# Patient Record
Sex: MALE | Race: WHITE | NOT HISPANIC OR LATINO | Employment: PART TIME | URBAN - NONMETROPOLITAN AREA
[De-identification: names, ages, dates, MRNs, and addresses within clinical notes are randomized per-mention and may not be internally consistent; named-entity substitution may affect disease eponyms.]

---

## 2017-03-29 ENCOUNTER — APPOINTMENT (OUTPATIENT)
Dept: LAB | Facility: HOSPITAL | Age: 27
End: 2017-03-29

## 2017-03-29 ENCOUNTER — OFFICE VISIT (OUTPATIENT)
Dept: BARIATRICS/WEIGHT MGMT | Facility: CLINIC | Age: 27
End: 2017-03-29

## 2017-03-29 VITALS
SYSTOLIC BLOOD PRESSURE: 130 MMHG | HEIGHT: 73 IN | DIASTOLIC BLOOD PRESSURE: 75 MMHG | RESPIRATION RATE: 20 BRPM | TEMPERATURE: 98 F | WEIGHT: 181 LBS | BODY MASS INDEX: 23.99 KG/M2 | OXYGEN SATURATION: 100 % | HEART RATE: 80 BPM

## 2017-03-29 DIAGNOSIS — Z86.39 HISTORY OF MORBID OBESITY: ICD-10-CM

## 2017-03-29 DIAGNOSIS — R53.83 FATIGUE, UNSPECIFIED TYPE: ICD-10-CM

## 2017-03-29 DIAGNOSIS — E66.9 DIABETES MELLITUS TYPE 2 IN OBESE (HCC): ICD-10-CM

## 2017-03-29 DIAGNOSIS — Z98.84 S/P GASTRIC BYPASS: Primary | ICD-10-CM

## 2017-03-29 DIAGNOSIS — I10 ESSENTIAL HYPERTENSION: ICD-10-CM

## 2017-03-29 DIAGNOSIS — Z13.21 SCREENING FOR MALNUTRITION: ICD-10-CM

## 2017-03-29 DIAGNOSIS — E11.69 DIABETES MELLITUS TYPE 2 IN OBESE (HCC): ICD-10-CM

## 2017-03-29 LAB
25(OH)D3 SERPL-MCNC: 38.1 NG/ML (ref 30–100)
ALBUMIN SERPL-MCNC: 4.2 G/DL (ref 3.5–5)
ALBUMIN/GLOB SERPL: 1.4 G/DL (ref 1.1–2.5)
ALP SERPL-CCNC: 63 U/L (ref 24–120)
ALT SERPL W P-5'-P-CCNC: 22 U/L (ref 0–54)
ANION GAP SERPL CALCULATED.3IONS-SCNC: 12 MMOL/L (ref 4–13)
ARTICHOKE IGE QN: 58 MG/DL (ref 0–99)
AST SERPL-CCNC: 37 U/L (ref 7–45)
BILIRUB SERPL-MCNC: 0.7 MG/DL (ref 0.1–1)
BUN BLD-MCNC: 11 MG/DL (ref 5–21)
BUN/CREAT SERPL: 18.3 (ref 7–25)
CALCIUM SPEC-SCNC: 9.8 MG/DL (ref 8.4–10.4)
CHLORIDE SERPL-SCNC: 101 MMOL/L (ref 98–110)
CHOLEST SERPL-MCNC: 108 MG/DL (ref 130–200)
CO2 SERPL-SCNC: 32 MMOL/L (ref 24–31)
CREAT BLD-MCNC: 0.6 MG/DL (ref 0.5–1.4)
DEPRECATED RDW RBC AUTO: 39.8 FL (ref 40–54)
ERYTHROCYTE [DISTWIDTH] IN BLOOD BY AUTOMATED COUNT: 13.3 % (ref 12–15)
FOLATE SERPL-MCNC: 12.6 NG/ML
GFR SERPL CREATININE-BSD FRML MDRD: >150 ML/MIN/1.73
GLOBULIN UR ELPH-MCNC: 2.9 GM/DL
GLUCOSE BLD-MCNC: 82 MG/DL (ref 70–100)
HBA1C MFR BLD: 4.5 %
HCT VFR BLD AUTO: 41.8 % (ref 40–52)
HDLC SERPL-MCNC: 37 MG/DL
HGB BLD-MCNC: 14.2 G/DL (ref 14–18)
IRON 24H UR-MRATE: 170 MCG/DL (ref 42–180)
LDLC/HDLC SERPL: 1.66 {RATIO}
MAGNESIUM SERPL-MCNC: 1.7 MG/DL (ref 1.4–2.2)
MCH RBC QN AUTO: 28.2 PG (ref 28–32)
MCHC RBC AUTO-ENTMCNC: 34 G/DL (ref 33–36)
MCV RBC AUTO: 83.1 FL (ref 82–95)
PHOSPHATE SERPL-MCNC: 3.5 MG/DL (ref 2.5–4.5)
PLATELET # BLD AUTO: 255 10*3/MM3 (ref 130–400)
PMV BLD AUTO: 9.6 FL (ref 6–12)
POTASSIUM BLD-SCNC: 3.9 MMOL/L (ref 3.5–5.3)
PROT SERPL-MCNC: 7.1 G/DL (ref 6.3–8.7)
RBC # BLD AUTO: 5.03 10*6/MM3 (ref 4.8–5.9)
SODIUM BLD-SCNC: 145 MMOL/L (ref 135–145)
TRIGL SERPL-MCNC: 48 MG/DL (ref 0–149)
TSH SERPL DL<=0.05 MIU/L-ACNC: 1.64 MIU/ML (ref 0.47–4.68)
VIT B12 BLD-MCNC: 685 PG/ML (ref 239–931)
WBC NRBC COR # BLD: 6.49 10*3/MM3 (ref 4.8–10.8)

## 2017-03-29 PROCEDURE — 36415 COLL VENOUS BLD VENIPUNCTURE: CPT | Performed by: NURSE PRACTITIONER

## 2017-03-29 PROCEDURE — 99214 OFFICE O/P EST MOD 30 MIN: CPT | Performed by: NURSE PRACTITIONER

## 2017-03-29 PROCEDURE — 83735 ASSAY OF MAGNESIUM: CPT | Performed by: NURSE PRACTITIONER

## 2017-03-29 PROCEDURE — 82306 VITAMIN D 25 HYDROXY: CPT | Performed by: NURSE PRACTITIONER

## 2017-03-29 PROCEDURE — 84590 ASSAY OF VITAMIN A: CPT | Performed by: NURSE PRACTITIONER

## 2017-03-29 PROCEDURE — 84443 ASSAY THYROID STIM HORMONE: CPT | Performed by: NURSE PRACTITIONER

## 2017-03-29 PROCEDURE — 84425 ASSAY OF VITAMIN B-1: CPT | Performed by: NURSE PRACTITIONER

## 2017-03-29 PROCEDURE — 80061 LIPID PANEL: CPT | Performed by: NURSE PRACTITIONER

## 2017-03-29 PROCEDURE — 82607 VITAMIN B-12: CPT | Performed by: NURSE PRACTITIONER

## 2017-03-29 PROCEDURE — 84100 ASSAY OF PHOSPHORUS: CPT | Performed by: NURSE PRACTITIONER

## 2017-03-29 PROCEDURE — 80053 COMPREHEN METABOLIC PANEL: CPT | Performed by: NURSE PRACTITIONER

## 2017-03-29 PROCEDURE — 84446 ASSAY OF VITAMIN E: CPT | Performed by: NURSE PRACTITIONER

## 2017-03-29 PROCEDURE — 83540 ASSAY OF IRON: CPT | Performed by: NURSE PRACTITIONER

## 2017-03-29 PROCEDURE — 84630 ASSAY OF ZINC: CPT | Performed by: NURSE PRACTITIONER

## 2017-03-29 PROCEDURE — 85027 COMPLETE CBC AUTOMATED: CPT | Performed by: NURSE PRACTITIONER

## 2017-03-29 PROCEDURE — 84597 ASSAY OF VITAMIN K: CPT | Performed by: NURSE PRACTITIONER

## 2017-03-29 PROCEDURE — 82746 ASSAY OF FOLIC ACID SERUM: CPT | Performed by: NURSE PRACTITIONER

## 2017-03-29 PROCEDURE — 83036 HEMOGLOBIN GLYCOSYLATED A1C: CPT | Performed by: NURSE PRACTITIONER

## 2017-03-29 NOTE — PROGRESS NOTES
"Subjective   Kevin Marr is a 26 y.o. male.     History of Present Illness Kevin Marr is post RYGB bariatric surgery with Dr. Abreu. He is actually 20 months post. Patient is able to get 80 ounces of water in per day. Patient is able to get 60-80  grams of protein in per day. Patient is taking a multiple vitamin per day. Patient is taking Calcium with vitamin D supplement 2-3 times daily. He gets 4 meals per day, 2 of which are protein shakes as meal replacements. Patient is exercising by going to gym and park. Patient does not have any complaints of reflux, dysphagia, N/V, or abdominal pain. He is having some tightness in his left lower abdomen toward his back and has a lump there. He is tired a lot.       The following portions of the patient's history were reviewed and updated as appropriate: allergies, current medications, past family history, past medical history, past social history, past surgical history and problem list.    Review of Systems   Constitutional: Positive for fatigue. Negative for activity change and appetite change.   HENT: Negative.    Eyes: Negative.    Respiratory: Negative.  Negative for apnea, cough, chest tightness and shortness of breath.    Cardiovascular: Negative.  Negative for chest pain, palpitations and leg swelling.   Gastrointestinal: Negative.  Negative for abdominal pain, anal bleeding, constipation, nausea and vomiting.        Intermittent \"tightness\" in left lower quadrant and feels like a \"lump\" may be there   Endocrine: Negative.    Genitourinary: Negative.    Musculoskeletal: Negative.  Negative for arthralgias and back pain.   Skin: Negative.    Allergic/Immunologic: Negative.    Neurological: Negative.  Negative for seizures and syncope.   Hematological: Negative.  Does not bruise/bleed easily.   Psychiatric/Behavioral: Negative.  Negative for dysphoric mood, self-injury and sleep disturbance.       Objective   Physical Exam   Constitutional: He is oriented " to person, place, and time. Vital signs are normal. He appears well-developed and well-nourished. He is cooperative. No distress.   HENT:   Head: Normocephalic and atraumatic.   Nose: Nose normal.   Mouth/Throat: Oropharynx is clear and moist. No oropharyngeal exudate or tonsillar abscesses.   Gauges noted to ear lobes   Eyes: Conjunctivae, EOM and lids are normal. Pupils are equal, round, and reactive to light. Right eye exhibits no discharge. Left eye exhibits no discharge.   Glasses noted   Neck: Trachea normal. Neck supple. No JVD present. Carotid bruit is not present. No rigidity. No tracheal deviation present. No thyromegaly present.   Cardiovascular: Normal rate, regular rhythm, S1 normal, S2 normal and normal heart sounds.    Pulmonary/Chest: Effort normal and breath sounds normal. No stridor. No respiratory distress. He has no wheezes. He has no rales.   Abdominal: Soft. Bowel sounds are normal. He exhibits no distension. There is no tenderness.   Scattered lap scars noted; no hernias noted; small cyst or lipoma palpated over to left side of abdomen near waist line   Musculoskeletal: He exhibits no edema.        Right shoulder: He exhibits normal strength.   Lymphadenopathy:     He has no cervical adenopathy.   Neurological: He is alert and oriented to person, place, and time. He has normal strength. No cranial nerve deficit.   Skin: Skin is warm, dry and intact. No rash noted.   Psychiatric: He has a normal mood and affect. His speech is normal and behavior is normal.   Alert and oriented x 3   Vitals reviewed.      Assessment/Plan   Kevin was seen today for weight loss and obesity.    Diagnoses and all orders for this visit:    S/P gastric bypass  -     CBC (No Diff)  -     Comprehensive Metabolic Panel  -     Folate  -     Iron  -     Magnesium  -     Phosphorus  -     TSH  -     Vitamin A  -     Vitamin B1, Whole Blood  -     Vitamin B12  -     Vitamin D 25 Hydroxy  -     Vitamin E  -     Vitamin K1  -      Zinc  -     Lipid Panel  -     Hemoglobin A1c    Body mass index (BMI) of 23.0-23.9 in adult  -     CBC (No Diff)  -     Comprehensive Metabolic Panel  -     Folate  -     Iron  -     Magnesium  -     Phosphorus  -     TSH  -     Vitamin A  -     Vitamin B1, Whole Blood  -     Vitamin B12  -     Vitamin D 25 Hydroxy  -     Vitamin E  -     Vitamin K1  -     Zinc  -     Lipid Panel  -     Hemoglobin A1c    Screening for malnutrition  -     CBC (No Diff)  -     Comprehensive Metabolic Panel  -     Folate  -     Iron  -     Magnesium  -     Phosphorus  -     TSH  -     Vitamin A  -     Vitamin B1, Whole Blood  -     Vitamin B12  -     Vitamin D 25 Hydroxy  -     Vitamin E  -     Vitamin K1  -     Zinc  -     Lipid Panel  -     Hemoglobin A1c    Diabetes mellitus type 2 in obese  -     CBC (No Diff)  -     Comprehensive Metabolic Panel  -     Folate  -     Iron  -     Magnesium  -     Phosphorus  -     TSH  -     Vitamin A  -     Vitamin B1, Whole Blood  -     Vitamin B12  -     Vitamin D 25 Hydroxy  -     Vitamin E  -     Vitamin K1  -     Zinc  -     Lipid Panel  -     Hemoglobin A1c    Essential hypertension  -     CBC (No Diff)  -     Comprehensive Metabolic Panel  -     Folate  -     Iron  -     Magnesium  -     Phosphorus  -     TSH  -     Vitamin A  -     Vitamin B1, Whole Blood  -     Vitamin B12  -     Vitamin D 25 Hydroxy  -     Vitamin E  -     Vitamin K1  -     Zinc  -     Lipid Panel  -     Hemoglobin A1c    History of morbid obesity  -     CBC (No Diff)  -     Comprehensive Metabolic Panel  -     Folate  -     Iron  -     Magnesium  -     Phosphorus  -     TSH  -     Vitamin A  -     Vitamin B1, Whole Blood  -     Vitamin B12  -     Vitamin D 25 Hydroxy  -     Vitamin E  -     Vitamin K1  -     Zinc  -     Lipid Panel  -     Hemoglobin A1c    Fatigue, unspecified type  -     CBC (No Diff)  -     Comprehensive Metabolic Panel  -     Folate  -     Iron  -     Magnesium  -     Phosphorus  -     TSH  -      Vitamin A  -     Vitamin B1, Whole Blood  -     Vitamin B12  -     Vitamin D 25 Hydroxy  -     Vitamin E  -     Vitamin K1  -     Zinc  -     Lipid Panel  -     Hemoglobin A1c          Patient is doing well overall. Having some fatigue.   Lab work will be drawn today. Results will be called to you once everything is completed.    Goals: continue positive lifestyle changes with diet and exercise.   Patient subscribes to Support group emails.  Patient will follow up in 12 months and will call the office if needs anything prior to that appointment.

## 2017-03-29 NOTE — PATIENT INSTRUCTIONS
Patient is doing well overall. Having some fatigue.   Lab work will be drawn today. Results will be called to you once everything is completed.    Goals: continue positive lifestyle changes with diet and exercise.   Patient subscribes to Support group emails.  Patient will follow up in 12 months and will call the office if needs anything prior to that appointment.

## 2017-03-31 LAB
VIT B1 BLD-SCNC: 138.8 NMOL/L (ref 66.5–200)
ZINC SERPL-MCNC: 75 UG/DL (ref 56–134)

## 2017-04-01 LAB
A-TOCOPHEROL VIT E SERPL-MCNC: 6.6 MG/L (ref 5.3–17.5)
PHYTONADIONE SERPL-MCNC: 0.24 NG/ML (ref 0.13–1.88)
VIT A SERPL-MCNC: 41 UG/DL (ref 24–85)

## 2018-05-17 ENCOUNTER — OFFICE VISIT (OUTPATIENT)
Dept: VASCULAR SURGERY | Facility: CLINIC | Age: 28
End: 2018-05-17

## 2018-05-17 VITALS
DIASTOLIC BLOOD PRESSURE: 80 MMHG | SYSTOLIC BLOOD PRESSURE: 124 MMHG | BODY MASS INDEX: 23.86 KG/M2 | HEIGHT: 73 IN | HEART RATE: 80 BPM | WEIGHT: 180 LBS | OXYGEN SATURATION: 98 %

## 2018-05-17 DIAGNOSIS — I87.2 VENOUS (PERIPHERAL) INSUFFICIENCY: Primary | ICD-10-CM

## 2018-05-17 DIAGNOSIS — M79.89 LEG SWELLING: ICD-10-CM

## 2018-05-17 PROCEDURE — 99204 OFFICE O/P NEW MOD 45 MIN: CPT | Performed by: SURGERY

## 2018-05-17 NOTE — PROGRESS NOTES
05/17/2018      Kevin Bellamy MD  [address] [address]    Kevin Marr  1990    Chief Complaint   Patient presents with   • Follow-up     Dr Veras referred over for painful varicosities. Patient states no stroke like symptoms. Patient states has knots up an down his legs. He states the other night was playing a game felt like a bug was crawling up his leg looked down vein was blue and popped out and instantly turned purple.        Dear Kevin Bellamy MD:      HPI  I had the pleasure of seeing your patient Kevin Marr in the office today.  Thank you kindly for this consultation.  As you recall, Kevin Marr is a 27 y.o.  male who you are currently following for Routine health maintenance.  Patient has previously had gastric bypass surgery and has lost a significant amount of weight.  He has heaviness and tiredness of both his legs over the last 1-2 months that is progressively getting worse.  He also occasionally has cramps at night when he sleeps.  He has no history of DVT or phlebitis.  He also denies using compression stockings.    Past Medical History:   Diagnosis Date   • Anxiety    • Attention deficit hyperactivity disorder    • Depression    • Diabetes mellitus    • Hypertension     no current problems    • Varicose veins of leg with pain        Past Surgical History:   Procedure Laterality Date   • INGUINAL HERNIA REPAIR     • LYMPH NODE BIOPSY     • SAMIR-EN-Y PROCEDURE  07/24/2015    Dr Abreu [facility]    • THYROGLOSSAL CYSTECTOMY      removal of thryoglossal duct cyst 4 surgeries       Family History   Problem Relation Age of Onset   • Adopted: Yes   • Family history unknown: Yes       Social History     Social History   • Marital status: Single     Spouse name: N/A   • Number of children: N/A   • Years of education: N/A     Occupational History   • Not on file.     Social History Main Topics   • Smoking status: Former Smoker      "Types: Cigarettes   • Smokeless tobacco: Not on file      Comment: Quit 2015   • Alcohol use Yes      Comment: Ocassional   • Drug use: No   • Sexual activity: Not on file     Other Topics Concern   • Not on file     Social History Narrative   • No narrative on file       Allergies   Allergen Reactions   • Zithromax [Azithromycin] Swelling       Prior to Admission medications    Medication Sig Start Date End Date Taking? Authorizing Provider   Ascorbic Acid (VITAMIN C PO) Take  by mouth Daily.   Yes Historical Provider, MD   calcium citrate-vitamin d (CITRACAL) 200-250 MG-UNIT tablet tablet Take 1 tablet by mouth Daily.   Yes Historical Provider, MD   Cyanocobalamin (VITAMIN B 12 PO) Take  by mouth Daily.   Yes Historical Provider, MD   Multiple Vitamin (MULTI VITAMIN PO) Take  by mouth Daily.   Yes Historical Provider, MD   sertraline (ZOLOFT) 50 MG tablet Take 50 mg by mouth Daily.   Yes Historical Provider, MD       Review of Systems   Constitutional: Negative.    HENT: Negative.    Eyes: Negative.    Respiratory: Negative.    Cardiovascular: Positive for leg swelling.        Leg cramping   Gastrointestinal: Negative.    Endocrine: Negative.    Genitourinary: Negative.    Musculoskeletal: Negative.    Skin: Negative.    Allergic/Immunologic: Negative.    Neurological: Negative.    Hematological: Negative.    Psychiatric/Behavioral: Negative.        /80 (BP Location: Right arm, Patient Position: Sitting)   Pulse 80   Ht 184.2 cm (72.5\")   Wt 81.6 kg (180 lb)   SpO2 98%   BMI 24.08 kg/m²   Physical Exam   Constitutional: He is oriented to person, place, and time. He appears well-developed and well-nourished.   HENT:   Head: Normocephalic and atraumatic.   Eyes: Pupils are equal, round, and reactive to light. No scleral icterus.   Neck: Neck supple. No JVD present. Carotid bruit is not present. No thyromegaly present.   Cardiovascular: Normal rate, regular rhythm and normal heart sounds.    Pulses:       " Carotid pulses are 2+ on the right side, and 2+ on the left side.       Femoral pulses are 2+ on the right side, and 2+ on the left side.       Popliteal pulses are 2+ on the right side, and 2+ on the left side.        Dorsalis pedis pulses are 2+ on the right side, and 2+ on the left side.        Posterior tibial pulses are 2+ on the right side, and 2+ on the left side.   Varicose veins noted in both lower extremities   Pulmonary/Chest: Effort normal and breath sounds normal.   Abdominal: Soft. Bowel sounds are normal. He exhibits no distension, no abdominal bruit and no mass. There is no hepatosplenomegaly. There is no tenderness.   Musculoskeletal: Normal range of motion. He exhibits no edema.   Lymphadenopathy:     He has no cervical adenopathy.   Neurological: He is alert and oriented to person, place, and time. He has normal strength. No cranial nerve deficit or sensory deficit.   Skin: Skin is warm, dry and intact.   Psychiatric: He has a normal mood and affect.   Nursing note and vitals reviewed.      No results found.    Patient Active Problem List   Diagnosis   • Leg swelling   • Venous (peripheral) insufficiency         ICD-10-CM ICD-9-CM   1. Venous (peripheral) insufficiency I87.2 459.81   2. Leg swelling M79.89 729.81           Plan: After thoroughly evaluating Kevin Marr, I believe the best course of action is to Initially remain conservative from a vascular surgery standpoint.  He does have evidence of venous insufficiency with varicose veins.  All of his symptoms are related to this.  I did give him compression stockings in the 20-30 mmHg range.  I instructed him on how to use them and to keep his legs elevated when he is not on them.  I will see him back in 3 months time with a 2 leg VVI to see if he is a candidate for endovenous closure.  The patient can continue taking her current medication regimen as previously planned.  This was all discussed in full with complete understanding.    Thank  you for allowing me to participate in the care of your patient.  Please do not hesitate with any questions or concerns.  I will keep you aware of any further encounters with Kevin Marr.        Sincerely yours,         Rudy Silva, DO    No Known Provider

## 2018-08-15 ENCOUNTER — TELEPHONE (OUTPATIENT)
Dept: VASCULAR SURGERY | Facility: CLINIC | Age: 28
End: 2018-08-15

## 2018-08-16 ENCOUNTER — APPOINTMENT (OUTPATIENT)
Dept: ULTRASOUND IMAGING | Facility: HOSPITAL | Age: 28
End: 2018-08-16
Attending: SURGERY

## 2020-03-03 ENCOUNTER — APPOINTMENT (OUTPATIENT)
Dept: CT IMAGING | Facility: HOSPITAL | Age: 30
End: 2020-03-03

## 2020-03-03 ENCOUNTER — HOSPITAL ENCOUNTER (EMERGENCY)
Facility: HOSPITAL | Age: 30
Discharge: HOME | End: 2020-03-03
Admitting: EMERGENCY MEDICINE

## 2020-03-03 VITALS
RESPIRATION RATE: 15 BRPM | WEIGHT: 206 LBS | HEART RATE: 87 BPM | HEIGHT: 72 IN | SYSTOLIC BLOOD PRESSURE: 151 MMHG | TEMPERATURE: 98.3 F | DIASTOLIC BLOOD PRESSURE: 72 MMHG | BODY MASS INDEX: 27.9 KG/M2 | OXYGEN SATURATION: 100 %

## 2020-03-03 DIAGNOSIS — E04.1 THYROID NODULE: Primary | ICD-10-CM

## 2020-03-03 LAB
ALBUMIN SERPL-MCNC: 4.7 G/DL (ref 3.5–5.2)
ALBUMIN/GLOB SERPL: 1.7 G/DL
ALP SERPL-CCNC: 65 U/L (ref 39–117)
ALT SERPL W P-5'-P-CCNC: 13 U/L (ref 1–41)
ANION GAP SERPL CALCULATED.3IONS-SCNC: 13 MMOL/L (ref 5–15)
AST SERPL-CCNC: 20 U/L (ref 1–40)
BASOPHILS # BLD AUTO: 0.03 10*3/MM3 (ref 0–0.2)
BASOPHILS NFR BLD AUTO: 0.4 % (ref 0–1.5)
BILIRUB SERPL-MCNC: 0.7 MG/DL (ref 0.2–1.2)
BUN BLD-MCNC: 11 MG/DL (ref 6–20)
BUN/CREAT SERPL: 15.7 (ref 7–25)
CALCIUM SPEC-SCNC: 9.9 MG/DL (ref 8.6–10.5)
CHLORIDE SERPL-SCNC: 102 MMOL/L (ref 98–107)
CO2 SERPL-SCNC: 27 MMOL/L (ref 22–29)
CREAT BLD-MCNC: 0.7 MG/DL (ref 0.76–1.27)
DEPRECATED RDW RBC AUTO: 37.1 FL (ref 37–54)
EOSINOPHIL # BLD AUTO: 0.06 10*3/MM3 (ref 0–0.4)
EOSINOPHIL NFR BLD AUTO: 0.7 % (ref 0.3–6.2)
ERYTHROCYTE [DISTWIDTH] IN BLOOD BY AUTOMATED COUNT: 12.3 % (ref 12.3–15.4)
FLUAV AG NPH QL: NEGATIVE
FLUBV AG NPH QL IA: NEGATIVE
GFR SERPL CREATININE-BSD FRML MDRD: 135 ML/MIN/1.73
GLOBULIN UR ELPH-MCNC: 2.8 GM/DL
GLUCOSE BLD-MCNC: 91 MG/DL (ref 65–99)
HCT VFR BLD AUTO: 44.6 % (ref 37.5–51)
HGB BLD-MCNC: 15.8 G/DL (ref 13–17.7)
IMM GRANULOCYTES # BLD AUTO: 0.03 10*3/MM3 (ref 0–0.05)
IMM GRANULOCYTES NFR BLD AUTO: 0.4 % (ref 0–0.5)
LYMPHOCYTES # BLD AUTO: 1.82 10*3/MM3 (ref 0.7–3.1)
LYMPHOCYTES NFR BLD AUTO: 21.7 % (ref 19.6–45.3)
MCH RBC QN AUTO: 29.4 PG (ref 26.6–33)
MCHC RBC AUTO-ENTMCNC: 35.4 G/DL (ref 31.5–35.7)
MCV RBC AUTO: 82.9 FL (ref 79–97)
MONOCYTES # BLD AUTO: 0.41 10*3/MM3 (ref 0.1–0.9)
MONOCYTES NFR BLD AUTO: 4.9 % (ref 5–12)
NEUTROPHILS # BLD AUTO: 6.04 10*3/MM3 (ref 1.7–7)
NEUTROPHILS NFR BLD AUTO: 71.9 % (ref 42.7–76)
NRBC BLD AUTO-RTO: 0 /100 WBC (ref 0–0.2)
PLATELET # BLD AUTO: 225 10*3/MM3 (ref 140–450)
PMV BLD AUTO: 9 FL (ref 6–12)
POTASSIUM BLD-SCNC: 3.7 MMOL/L (ref 3.5–5.2)
PROT SERPL-MCNC: 7.5 G/DL (ref 6–8.5)
RBC # BLD AUTO: 5.38 10*6/MM3 (ref 4.14–5.8)
S PYO AG THROAT QL: NEGATIVE
SODIUM BLD-SCNC: 142 MMOL/L (ref 136–145)
T4 FREE SERPL-MCNC: 1.34 NG/DL (ref 0.93–1.7)
TSH SERPL DL<=0.05 MIU/L-ACNC: 1.01 UIU/ML (ref 0.27–4.2)
WBC NRBC COR # BLD: 8.39 10*3/MM3 (ref 3.4–10.8)

## 2020-03-03 PROCEDURE — 84439 ASSAY OF FREE THYROXINE: CPT | Performed by: PHYSICIAN ASSISTANT

## 2020-03-03 PROCEDURE — 87081 CULTURE SCREEN ONLY: CPT | Performed by: PHYSICIAN ASSISTANT

## 2020-03-03 PROCEDURE — 87804 INFLUENZA ASSAY W/OPTIC: CPT | Performed by: PHYSICIAN ASSISTANT

## 2020-03-03 PROCEDURE — 85025 COMPLETE CBC W/AUTO DIFF WBC: CPT | Performed by: PHYSICIAN ASSISTANT

## 2020-03-03 PROCEDURE — 25010000002 IOPAMIDOL 61 % SOLUTION: Performed by: PHYSICIAN ASSISTANT

## 2020-03-03 PROCEDURE — 87880 STREP A ASSAY W/OPTIC: CPT | Performed by: PHYSICIAN ASSISTANT

## 2020-03-03 PROCEDURE — 96360 HYDRATION IV INFUSION INIT: CPT

## 2020-03-03 PROCEDURE — 99283 EMERGENCY DEPT VISIT LOW MDM: CPT

## 2020-03-03 PROCEDURE — 96361 HYDRATE IV INFUSION ADD-ON: CPT

## 2020-03-03 PROCEDURE — 70491 CT SOFT TISSUE NECK W/DYE: CPT

## 2020-03-03 PROCEDURE — 80053 COMPREHEN METABOLIC PANEL: CPT | Performed by: PHYSICIAN ASSISTANT

## 2020-03-03 PROCEDURE — 84443 ASSAY THYROID STIM HORMONE: CPT | Performed by: PHYSICIAN ASSISTANT

## 2020-03-03 PROCEDURE — 84481 FREE ASSAY (FT-3): CPT | Performed by: PHYSICIAN ASSISTANT

## 2020-03-03 RX ORDER — SODIUM CHLORIDE 9 MG/ML
125 INJECTION, SOLUTION INTRAVENOUS CONTINUOUS
Status: DISCONTINUED | OUTPATIENT
Start: 2020-03-03 | End: 2020-03-03 | Stop reason: HOSPADM

## 2020-03-03 RX ADMIN — SODIUM CHLORIDE 125 ML/HR: 9 INJECTION, SOLUTION INTRAVENOUS at 17:05

## 2020-03-03 RX ADMIN — IOPAMIDOL 100 ML: 612 INJECTION, SOLUTION INTRAVENOUS at 18:12

## 2020-03-03 NOTE — ED PROVIDER NOTES
Subjective   History of Present Illness    Patient is a pleasant 29-year-old male chief complaint of throat mass.  The patient describes for the past 3 days, he noted some irritation, difficulty swallowing pills, and a mass develop around his thyroid.  This mass is getting larger in size.  He denies any difficulty breathing but is concerned.  He has had cough and congestion for the past several days as well.  He reports green productive phlegm.  His roommate has similar symptoms as well in regards to the respiratory symptoms.  He denies any hemoptysis.  He denies any unexpected weight loss.  He does have tender lymph nodes in front of his right ear.  He denies any abnormal rash.  He is concerned because at the age of [age], he did have a thyroglossal cystectomy.  He denies any the tests revealing cancer.  He reports it was benign.  He denies any associated vomiting or diarrhea.  He reports normal urinary output.    Review of Systems   Constitutional: Positive for activity change. Negative for fever.   HENT: Positive for sore throat and trouble swallowing.    Eyes: Negative.    Respiratory: Positive for cough.    Cardiovascular: Negative.    Gastrointestinal: Negative.    Genitourinary: Negative.    Musculoskeletal: Negative.    Skin: Negative.    Neurological: Negative.    Psychiatric/Behavioral: Negative.        Past Medical History:   Diagnosis Date   • Anxiety    • Attention deficit hyperactivity disorder    • Depression    • Diabetes mellitus (CMS/HCC)     patient reports that he is no longer diabetic    • Disease of thyroid gland     thyroid cyst    • Hypertension     no current problems    • Varicose veins of leg with pain        Allergies   Allergen Reactions   • Zithromax [Azithromycin] Swelling       Past Surgical History:   Procedure Laterality Date   • CIRCUMCISION     • INGUINAL HERNIA REPAIR     • LYMPH NODE BIOPSY     • SAMIR-EN-Y PROCEDURE  07/24/2015    Dr [facility]breu [facility]    •  "THYROGLOSSAL CYSTECTOMY      removal of thryoglossal duct cyst 4 surgeries       Family History   Adopted: Yes   Family history unknown: Yes       Social History     Socioeconomic History   • Marital status: Single     Spouse name: Not on file   • Number of children: Not on file   • Years of education: Not on file   • Highest education level: Not on file   Tobacco Use   • Smoking status: Former Smoker     Types: Cigarettes   • Tobacco comment: Quit 2015   Substance and Sexual Activity   • Alcohol use: Yes     Comment: Ocassional   • Drug use: No       Prior to Admission medications    Medication Sig Start Date End Date Taking? Authorizing Provider   Ascorbic Acid (VITAMIN C PO) Take  by mouth Daily.    Jeannie Valle MD   calcium citrate-vitamin d (CITRACAL) 200-250 MG-UNIT tablet tablet Take 1 tablet by mouth Daily.    Jeannie Valle MD   Cyanocobalamin (VITAMIN B 12 PO) Take  by mouth Daily.    Jeannie Valle MD   Multiple Vitamin (MULTI VITAMIN PO) Take  by mouth Daily.    Jeannie Valle MD   sertraline (ZOLOFT) 50 MG tablet Take 50 mg by mouth Daily.    Jeannie Valle MD       Medications   sodium chloride 0.9 % infusion (125 mL/hr Intravenous New Bag 3/3/20 1705)   iopamidol (ISOVUE-300) 61 % injection 100 mL (100 mL Intravenous Given 3/3/20 1812)       /87 (BP Location: Right arm, Patient Position: Sitting)   Pulse 98   Temp 98.3 °F (36.8 °C) (Oral)   Resp 14   Ht 182.9 cm (72\")   Wt 93.4 kg (206 lb)   SpO2 100%   BMI 27.94 kg/m²       Objective   Physical Exam   Constitutional: He is oriented to person, place, and time. He appears well-developed and well-nourished.   HENT:   Head: Normocephalic and atraumatic.   Right Ear: External ear normal.   Left Ear: External ear normal.   Nose: Nose normal.   Mouth/Throat: Oropharynx is clear and moist.   Eyes: Pupils are equal, round, and reactive to light. Conjunctivae and EOM are normal.   Neck: Normal range of motion. " Neck supple. No tracheal deviation present. Thyromegaly present.   Cardiovascular: Normal rate, regular rhythm, normal heart sounds and intact distal pulses. Exam reveals no gallop and no friction rub.   No murmur heard.  Pulmonary/Chest: Effort normal and breath sounds normal. No respiratory distress. He has no wheezes. He has no rales. He exhibits no tenderness.   Abdominal: Soft. Bowel sounds are normal. He exhibits no distension and no mass. There is no tenderness. There is no rebound and no guarding.   Musculoskeletal: Normal range of motion. He exhibits no edema, tenderness or deformity.   Lymphadenopathy:        Head (right side): No submental, no submandibular, no tonsillar, no preauricular, no posterior auricular and no occipital adenopathy present.        Head (left side): No submental, no submandibular, no tonsillar, no preauricular, no posterior auricular and no occipital adenopathy present.     He has no cervical adenopathy.        Right cervical: No superficial cervical, no deep cervical and no posterior cervical adenopathy present.       Left cervical: No superficial cervical, no deep cervical and no posterior cervical adenopathy present.     He has no axillary adenopathy.        Right: No inguinal, no supraclavicular and no epitrochlear adenopathy present.        Left: No inguinal, no supraclavicular and no epitrochlear adenopathy present.   Neurological: He is alert and oriented to person, place, and time. He has normal reflexes. He exhibits normal muscle tone. Coordination normal.   Skin: Skin is warm and dry. Capillary refill takes less than 2 seconds. No rash noted. No erythema. No pallor.   Psychiatric: He has a normal mood and affect. His behavior is normal. Judgment and thought content normal.   Vitals reviewed.      Procedures         Lab Results (last 24 hours)     Procedure Component Value Units Date/Time    CBC & Differential [[id number]] Collected:  03/03/20 1705    Specimen:  Blood  Updated:  03/03/20 1714    Narrative:       The following orders were created for panel order CBC & Differential.  Procedure                               Abnormality         Status                     ---------                               -----------         ------                     CBC Auto Differential[[id number]]        Abnormal            Final result                 Please view results for these tests on the individual orders.    Comprehensive Metabolic Panel [[id number]]  (Abnormal) Collected:  03/03/20 1705    Specimen:  Blood Updated:  03/03/20 1737     Glucose 91 mg/dL      BUN 11 mg/dL      Creatinine 0.70 mg/dL      Sodium 142 mmol/L      Potassium 3.7 mmol/L      Chloride 102 mmol/L      CO2 27.0 mmol/L      Calcium 9.9 mg/dL      Total Protein 7.5 g/dL      Albumin 4.70 g/dL      ALT (SGPT) 13 U/L      AST (SGOT) 20 U/L      Alkaline Phosphatase 65 U/L      Total Bilirubin 0.7 mg/dL      eGFR Non African Amer 135 mL/min/1.73      Globulin 2.8 gm/dL      A/G Ratio 1.7 g/dL      BUN/Creatinine Ratio 15.7     Anion Gap 13.0 mmol/L     Narrative:       GFR Normal >60  Chronic Kidney Disease <60  Kidney Failure <15      TSH [[id number]]  (Normal) Collected:  03/03/20 1705    Specimen:  Blood Updated:  03/03/20 1737     TSH 1.010 uIU/mL     T4, Free [[id number]]  (Normal) Collected:  03/03/20 1705    Specimen:  Blood Updated:  03/03/20 1737     Free T4 1.34 ng/dL     Narrative:       Results may be falsely increased if patient taking Biotin.      T3, Free [[id number]] Collected:  03/03/20 1705    Specimen:  Blood Updated:  03/03/20 1709    CBC Auto Differential [[id number]]  (Abnormal) Collected:  03/03/20 1705    Specimen:  Blood Updated:  03/03/20 1714     WBC 8.39 10*3/mm3      RBC 5.38 10*6/mm3      Hemoglobin 15.8 g/dL      Hematocrit 44.6 %      MCV 82.9 fL      MCH 29.4 pg      MCHC 35.4 g/dL      RDW 12.3 %      RDW-SD 37.1 fl      MPV 9.0 fL      Platelets 225 10*3/mm3      Neutrophil % 71.9 %       Lymphocyte % 21.7 %      Monocyte % 4.9 %      Eosinophil % 0.7 %      Basophil % 0.4 %      Immature Grans % 0.4 %      Neutrophils, Absolute 6.04 10*3/mm3      Lymphocytes, Absolute 1.82 10*3/mm3      Monocytes, Absolute 0.41 10*3/mm3      Eosinophils, Absolute 0.06 10*3/mm3      Basophils, Absolute 0.03 10*3/mm3      Immature Grans, Absolute 0.03 10*3/mm3      nRBC 0.0 /100 WBC     Rapid Strep A Screen - Swab, Throat [[id number]]  (Normal) Collected:  03/03/20 1710    Specimen:  Swab from Throat Updated:  03/03/20 1733     Strep A Ag Negative    Influenza Antigen, Rapid - Swab, Nasopharynx [[id number]]  (Normal) Collected:  03/03/20 1710    Specimen:  Swab from Nasopharynx Updated:  03/03/20 1733     Influenza A Ag, EIA Negative     Influenza B Ag, EIA Negative    Narrative:       Recommend confirmation of negative results by viral culture or molecular assay.    Beta Strep Culture, Throat - Swab, Throat [[id number]] Collected:  03/03/20 1710    Specimen:  Swab from Throat Updated:  03/03/20 1733          Ct Soft Tissue Neck With Contrast    Result Date: 3/3/2020  Narrative: CT SOFT TISSUE NECK W CONTRAST- 3/3/2020 6:07 PM CST  HISTORY: Possible thyroid mass. Dysphagia.  COMPARISON: NONE  DOSE LENGTH PRODUCT: 277 mGy cm. Automated exposure control was also utilized to decrease patient radiation dose.  TECHNIQUE: Serial helical tomographic images of the neck were obtained in the standard fashion following the intravenous administration of Isovue contrast.   FINDINGS:  Orbits, paranasal sinuses, and skull base: Normal  Nasopharynx: Normal  Suprahyoid neck: Normal oropharynx, oral cavity, parapharyngeal space and retropharyngeal space.  Infrahyoid neck: Normal larynx, hypopharynx and supraglottis.  Thyroid: There is nodularity of the thyroid isthmus without abnormal enhancement pattern. This does not cause mass effect upon the airway or esophagus.  Thoracic inlet: Normal lung apices and brachial plexus.  Lymph nodes:  Normal. No lymphadenopathy.  Vascular structures: Normal.  Bones: No acute fracture or worrisome lesion.  Other findings: None.      Impression: 1. No mass effect upon the airway or esophagus. No CT findings to explain the patient's difficulty swallowing. 2. Nonemergent swallow study could be obtained if symptoms persist. This report was finalized on 03/03/2020 18:21 by Dr. Scooter Kulkarni MD.      ED Course  ED Course as of Mar 03 1834   Tue Mar 03, 2020   1827 Patient and his father have been educated of the laboratory data and CT findings.  There is not a mass-effect upon the airway or esophagus.  There was not any CT findings complains of difficulty swallowing.  He does have evidence of nodularity of the thyroid isthmus without abnormal enhancement pattern.  This does not cause mass-effect upon the airway or esophagus.  I recommend the patient follow-up with otolaryngology as outpatient.  Patient and father voiced understanding.  He will be discharged in stable condition.  Strict return precautions advised.    [TK]      ED Course User Index  [TK] Severo Mills PA          [facility]    Final diagnoses:   Thyroid nodule          Severo Mills PA  03/03/20 1834

## 2020-03-03 NOTE — ED NOTES
Patient is a 29 year old male that presents to ER with complaints of a lump near his thyroid that is causing him difficulty swallowing and SOA. Patient reports onset of symptoms to be three days. Patient reports having a thyroglossal cystectomy when he was [age].      Lucrecia Nichols RN  03/03/20 [id number]

## 2020-03-04 LAB — T3FREE SERPL-MCNC: 3.07 PG/ML (ref 2–4.4)

## 2020-03-04 NOTE — DISCHARGE INSTRUCTIONS
Thyroid Nodule    A thyroid nodule is an isolated growth of thyroid cells that forms a lump in your thyroid gland. The thyroid gland is a butterfly-shaped gland. It is found in the lower front of your neck. This gland sends chemical messengers (hormones) through your blood to all parts of your body. These hormones are important in regulating your body temperature and helping your body to use energy.  Thyroid nodules are common. Most are not cancerous (benign). You may have one nodule or several nodules.  Different types of thyroid nodules include nodules that:  · Grow and fill with fluid (thyroid cysts).  · Produce too much thyroid hormone (hot nodules or hyperthyroid).  · Produce no thyroid hormone (cold nodules or hypothyroid).  · Form from cancer cells (thyroid cancers).  What are the causes?  In most cases, the cause of this condition is not known.  What increases the risk?  The following factors may make you more likely to develop this condition.  · Age. Thyroid nodules become more common in people who are older than 45 years of age.  · Gender.  ? Benign thyroid nodules are more common in women.  ? Cancerous (malignant) thyroid nodules are more common in men.  · A family history that includes:  ? Thyroid nodules.  ? Pheochromocytoma.  ? Thyroid carcinoma.  ? Hyperparathyroidism.  · Certain kinds of thyroid diseases, such as Hashimoto's thyroiditis.  · Lack of iodine in your diet.  · A history of head and neck radiation, such as from previous cancer treatment.  What are the signs or symptoms?  In many cases, there are no symptoms. If you have symptoms, they may include:  · A lump in your lower neck.  · Feeling a lump or tickle in your throat.  · Pain in your neck, jaw, or ear.  · Having trouble swallowing.  Hot nodules may cause symptoms that include:  · Weight loss.  · Warm, flushed skin.  · Feeling hot.  · Feeling nervous.  · A racing heartbeat.  Cold nodules may cause symptoms that include:  · Weight  gain.  · Dry skin.  · Brittle hair. This may also occur with hair loss.  · Feeling cold.  · Fatigue.  Thyroid cancer nodules may cause symptoms that include:  · Hard nodules that feel stuck to the thyroid gland.  · Hoarseness.  · Lumps in the glands near your thyroid (lymph nodes).  How is this diagnosed?  A thyroid nodule may be felt by your health care provider during a physical exam. This condition may also be diagnosed based on your symptoms. You may also have tests, including:  · An ultrasound. This may be done to confirm the diagnosis.  · A biopsy. This involves taking a sample from the nodule and looking at it under a microscope.  · Blood tests to make sure that your thyroid is working properly.  · A thyroid scan. This test uses a radioactive tracer injected into a vein to create an image of the thyroid gland on a computer screen.  · Imaging tests such as MRI or CT scan. These may be done if:  ? Your nodule is large.  ? Your nodule is blocking your airway.  ? Cancer is suspected.  How is this treated?  Treatment depends on the cause and size of your nodule or nodules. If the nodule is benign, treatment may not be necessary. Your health care provider may monitor the nodule to see if it goes away without treatment. If the nodule continues to grow, is cancerous, or does not go away, treatment may be needed. Treatment may include:  · Having a cystic nodule drained with a needle.  · Ablation therapy. In this treatment, alcohol is injected into the area of the nodule to destroy the cells. Ablation with heat (thermal ablation) may also be used.  · Radioactive iodine. In this treatment, radioactive iodine is given as a pill or liquid that you drink. This substance causes the thyroid nodule to shrink.  · Surgery to remove the nodule. Part or all of your thyroid gland may need to be removed as well.  · Medicines.  Follow these instructions at home:  · Pay attention to any changes in your nodule.  · Take  over-the-counter and prescription medicines only as told by your health care provider.  · Keep all follow-up visits as told by your health care provider. This is important.  Contact a health care provider if:  · Your voice changes.  · You have trouble swallowing.  · You have pain in your neck, ear, or jaw that is getting worse.  · Your nodule gets bigger.  · Your nodule starts to make it harder for you to breathe.  · Your muscles look like they are shrinking (muscle wasting).  Get help right away if:  · You have chest pain.  · There is a loss of consciousness.  · You have a sudden fever.  · You feel confused.  · You are seeing or hearing things that other people do not see or hear (having hallucinations).  · You feel very weak.  · You have mood swings.  · You feel very restless.  · You feel suddenly nauseous or throw up.  · You suddenly have diarrhea.  Summary  · A thyroid nodule is an isolated growth of thyroid cells that forms a lump in your thyroid gland.  · Thyroid nodules are common. Most are not cancerous (benign). You may have one nodule or several nodules.  · Treatment depends on the cause and size of your nodule or nodules. If the nodule is benign, treatment may not be necessary.  · Your health care provider may monitor the nodule to see if it goes away without treatment. If the nodule continues to grow, is cancerous, or does not go away, treatment may be needed.  This information is not intended to replace advice given to you by your health care provider. Make sure you discuss any questions you have with your health care provider.  Document Released: 11/10/2005 Document Revised: 08/05/2019 Document Reviewed: 08/05/2019  [employer] Patient Education © 2020 [employer]

## 2020-03-05 LAB — BACTERIA SPEC AEROBE CULT: NORMAL

## 2020-03-06 NOTE — ED NOTES
"ED Call Back Questions    1. How are you doing since leaving the Emergency Department?  Still bothering me a lot    2. Do you have any questions about your discharge instructions? No     3. Have you filled your new prescriptions yet? N/A  a. Do you have any questions about those medications? N/A    4. Were you able to make a follow-up appointment with the physician? Yes     5. Do you have a primary care physician? Yes   a. If No, would you like for me to set you up with one? N/A  i. If Yes, “I will have our ED  give you a call right back at this number to work with you on the best time for an appointment.”    6. We are always looking to get better at what we do. Do you have any suggestions for what we can do to be even better? N/A  a. If Yes, \"Thank you for sharing your concerns. I apologize. I will follow up with our manager and patient . Would you like someone to call you back?\" N/A    7. Is there anything else I can do for you? N/A       Alex Webster  03/06/20 1415    "

## 2020-05-22 ENCOUNTER — OFFICE VISIT (OUTPATIENT)
Dept: OTOLARYNGOLOGY | Facility: CLINIC | Age: 30
End: 2020-05-22

## 2020-05-22 VITALS
SYSTOLIC BLOOD PRESSURE: 147 MMHG | BODY MASS INDEX: 26.88 KG/M2 | DIASTOLIC BLOOD PRESSURE: 88 MMHG | HEART RATE: 107 BPM | TEMPERATURE: 98.3 F | WEIGHT: 202.8 LBS | HEIGHT: 73 IN

## 2020-05-22 DIAGNOSIS — Q89.2 THYROGLOSSAL CYST: ICD-10-CM

## 2020-05-22 DIAGNOSIS — R13.12 DYSPHAGIA, OROPHARYNGEAL: Primary | ICD-10-CM

## 2020-05-22 DIAGNOSIS — K21.9 GASTROESOPHAGEAL REFLUX DISEASE WITHOUT ESOPHAGITIS: ICD-10-CM

## 2020-05-22 DIAGNOSIS — M79.18 MYOFASCIAL PAIN: ICD-10-CM

## 2020-05-22 DIAGNOSIS — S13.5XXA: ICD-10-CM

## 2020-05-22 PROCEDURE — 99204 OFFICE O/P NEW MOD 45 MIN: CPT | Performed by: OTOLARYNGOLOGY

## 2020-05-22 NOTE — PATIENT INSTRUCTIONS
"THE PROBLEM OF ACID REFLUX    In BOTH children and adults, irritating acids may leak from the stomach and come up into the esophagus and throat. This can occur at any time, but occurs more commonly when lying down. When the acid touches the lining of the esophagus, there is irritation and muscle spasm, which can be felt up into the throat. Usually this is felt as \"heartburn\". When scarring of the esophagus occurs, the esophagus becomes less sensitive and the symptoms may only be in the throat.     Some of the symptoms that can occur with acid reflux into the throat include coughing, soreness, burning, hoarseness, throat clearing, excessive mucous, bad taste in the mouth, bad breath, a sensation of a lump in the throat, wheezing, post-nasal drip, choking spells, bleeding and nausea. In a small percentage of people, more serious problems may result, such as pneumonia, ulcers in the larynx (voice box), reduced mobility of the vocal cords and a pouch in the upper esophagus (diverticulum). In children, the symptoms may be different and include persistent vomiting, bleeding from the esophagus, respiratory problems, pneumonia, asthma, choking spells, swallowing problems and anemia. In some cases, unexplained fussiness and crying is due to reflux.     The following instructions are designed to help neutralize the stomach acid, reduce the production of the acid, promote emptying of the acid from the stomach into the intestines and prevent acid from coming up into the esophagus. You should adopt enough of these changes to alleviate your symptoms. If carrying out these suggestions produces no change, it is imperative that you return so that we can discuss further the problem. More testing may be required, as might medication. It is important to realize that the esophagus takes time to heal, so you should not expect results immediately.    Diet  Most often, the throat symptoms above are due to dietary abuses. Certain foods are " known to cause irritation, because they are acids themselves or cause excess production of stomach acid. These should be avoided, if possible. The following is a partial list of foods recognized as troublesome: coffee (even decaffeinated), tea chocolate, cola beverages, citrus beverages (such as 7-Up), caffeine containing beverages, alcohol, citrus fruits and juices, highly spiced foods, fatty foods, candies, nuts, mints, milk and milk products. Some of the worst offenders for promoting stomach acid are milk and beer.     Hard candies, gum, breath fresheners, throat lozenges, cough drops, mouthwashes, gargles, may actually irritate the throat directly (many cough drops and lozenges contain irritants such as oil of eucalyptus and menthol), and can also stimulate acid production directly.     Large amounts of liquid in the diet tend to promote reflux, because liquids tend to come back up more easily than solids.     Meals should be bland and consist of real food, trying to avoid recreational food. Multiple small meals, rather than one or two large meals helps prevent reflux by not stretching the stomach and increasing the time needed for digestion, as well increasing the amount of acid needed to digest the meal. If you are overweight, losing weight is tremendously helpful.     YOU SHOULD NOT EAT FOR AT LEAST TWO HOURS BEFORE RETIRING AND YOU SHOULD REMAIN SITTING OR STANDING FOR AT LEAST 45 MINUTES AFTER EACH MEAL. This promotes passage of food from the stomach into the intestine.    Posture  Body weight is a significant factor in promoting reflux. Losing weight is beneficial. Pregnancy will markedly increase the symptoms of heartburn and throat pain. You should avoid clothing that fits tightly across the mid-section, as this promotes squeezing of the abdominal contents upward. It is helpful to practice abdominal or diaphragmatic breathing, using the stomach to push out each breath rather than chest expansion. Avoid  slumping while sitting, and avoid stooping or straining.     For many, the reflux occurs at night while sleeping. This is the time acid production is the greatest and you are lying down, a position that promotes reflux, thus setting up the irritation that occurs the next morning. One of the most important things you can do is to elevate the head of the bed, in an effort to use gravity to keep the acid in the stomach. This is accomplished by placed blocks or bricks beneath the legs at the head of the bed, raising the head 6-10 inches. Do not make the head so high that you slide down. Pillows are not effective because they cause the body to curl, increasing abdominal pressure and it is difficult to remain upright on them. Additionally, pillows promote sleeping on the back, but it is far more desirable to sleep on the right side or on the stomach, as this allows gas to escape, while preventing the escape of acid material. Children and infants, who are refluxing, should sleep on their stomachs.  Exercise  Regular exercise is extremely beneficial in promoting good digestion and regularity. The abdominal muscles are toned, which aids in controlling acid problems. However, it is recommended that you not participate in vigorous activity immediately after eating. This causes pressure on an already full stomach, forcing acid up into the esophagus. Regular exercise is encouraged, prior to meals, or two hours after meals.  Antacids  Antacids should be used regularly, as they neutralize excess acid. Gelusil II, Mylanta II, Tums and Rolaids are popular brands. Gaviscon is not an antacid, but floats on top of the stomach acid, preventing esophageal irritation. Care should be used in administering large doses of antacids, especially in children. Many antacid preparations contain magnesium, which promotes frequent bowel movements, while antacids that contain aluminum can be constipating. Tums have a high calcium content that can be  used to some advantage in older women with reflux.     Antacid tablets are convenient, but the liquid form tends to work much more quickly. Also remember to check with your physician about interference with other medications. Antacids can slow the absorption of digitalis, Indocin, tetracyclines, fluorides and isoniazid from the intestines. Many medications require the presence of stomach acid to be absorbed.     Initially, antacids should be used 30 minutes after each meal, 2 hours after each meal and at bedtime. This maximizes the neutralization of the stomach acid. Antacids can be used more frequently if symptoms persist, but such extensive use needs to be reviewed with your physician.  Prescription Medications  If the above recommendations are not effectively resolving the symptoms, medications may be prescribed. These are usually in the form of acid production blockers, such as Tagamet, Zantac, Pepcid, or Axid or acid pump inhibitors like Prevacid, Nexium, Protonix or Prilosec. These drugs help stop acid production in the stomach. Medications such as Reglan can be used to promote stomach emptying.  Medications That Promote Reflux  Certain medications can promote acid reflux; either by relaxing the sphincter muscle that helps keeps stomach acid down, or increasing the acid production. These include progesterone (Provera, Ortho Novum, Ovral, other birth control pills), theophylline (Sheldon-Dur, etc.), anticholinergic (Donnatal, Scopolamine, Probanthine, Bentil, others), beta blockers (Inderal, Tenormin and Corgard), alpha blockers (Dibenzyline), dopamine, calcium channel blockers (Procardia, Cardizem, Isotin, Calan), aspirin, non-steroidal anti-inflammatory drugs (Motrin, Advil, Nuprin, Nalfon, Rufen, Indocin, Feldene, Clinoril and Tolectin). Vitamin C is an acid and can promote reflux. This is only a partial list and before stopping any prescription medication, you should check with your physician.    Goal  The  goal is to reduce the symptoms with the least number of lifestyle changes. A combination of the above suggestions is frequently prescribed to return you to normal as quickly as possible. However, this problem did not develop overnight and will not disappear rapidly. Therefore, developing a regimen will aid in controlling symptoms and keep you symptom free for a long period of time. Other alternatives exist, should these suggestions fail, and can be discussed with your physician.     To Summarize:  Watch your diet, avoid foods that cause acid reflux  Lose weight  Avoid tight fitting clothes  Adjust your posture, raise the head of the bed  Exercise regularly  Use antacids  Use prescription medication as directed  Avoid medications that promote reflux  Avoid behavior and eating habits that promote reflux of stomach acid     You are welcome to do a Google search on the topic of reflux and reflux diets. The internet has many useful resources.     Please remember, your success in controlling this condition depends on many factors including diet, exercise, medications and follow up. All are important and must be utilized to achieve success.       ANTACID USE:  Use antacids 30 mins after every meal, 2 hours after every meal and at Bedtime  Use Gaviscon Extra (best), Tums, Rolaids, etc  Over the counter  Use 2 tsp or 2 tabs as the dose  Remember that some of these products contain Calcium or Aluminum. If you have dietary or medical issues, please inform physician    Aleve 1 tab 2 times daily     APPLICATION OF HEAT AND ICE    At times, judicious application of heat or ice can accelerate healing, diminish swelling and reduce pain. Dr. Rock will frequently prescribe heat, ice or both as part of the treatment of your injury or surgery.     How to apply ice:  Never apply ice directly to the skin. Always place an insulating layer, such as a washcloth or ice bag, between the ice and the skin.  Ice bags can be made of zip lock  bags, water and ice, wrapped in a washcloth. Do not fill the bag too full and this will conform well around curves of the body, head and neck.  If your injury has just occurred, remember rest, ice, compression, and elevation (RICE). In an acute injury, ice is best applied for 48 to 72 hours to minimize swelling and pain. Doing this as often as possible (at least 4 times daily, but constantly if possible).     How to apply heat:  Never apply a heating pad while sleeping. This may lead to a burn. Heating pads apply continuous heat that can build up while sleeping.  Hot water bottles are excellent for applying heat.  Make a hot compress by heating a washcloth in the microwave, placing it in a zip lock bag and apply to the affected area.  You can use either dry heat or moist heat, whichever feels the best. Using moist heat will loosen scabbing and crusting, making the scabs easier to remove. This will also unstick eyelids that are stuck together.     Apply heat  for at least 15-20 minutes 4-5 times daily for 3 days  Apply to Neck    CT scan f neck ordered    ENT PREOPERATIVE PROTOCOL FOR SURGERY/PROCEDURE: Begin after COVID test has been performed  After test performed, PLEASE self-quarantine to prevent possible infection!! And start below  Betadine rinses:  Use Betadine rinse in the nose  • Spray twice in each nostril 3 times a day beginning 3 days before surgery  • Spray nose the morning of surgery, bring with you to the operating room  • Use Betadine rinse in the mouth  • Gargle, swish and spit 15 ml in mouth and rinse around teeth 3 times daily beginning 3 days prior to surgery  • Repeat morning of surgery before arriving at hospital  • Betadine rinse can be prescribed at the [facility], [place], operated by [facility]    Betadine Iodine mixture Recipe:  • Neilmed bottle from pharmacy  • Use Yoav Med packet that comes with the bottle  • Add Betadine/Povidone 10 ml (2 tsp) to the bottle  • Add Patrick baby shampoo 2.5 ml (½ tsp) to  the bottle  • Fill bottle with distilled water (from grocery store)    DO NOT USE IF IODINE/BETADINE ALLERGIC, OR HISTORY OF THYROID PROBLEMS/THYROID CANCER    Non Betadine rinses:  • Nasal rinses:  • Use rinse in the nose  • Spray twice in each nostril 3 times a day beginning 3 days before surgery  • Spray nose the morning of surgery, bring with you to the operating room  • Use Same rinse in the mouth  • Gargle, swish and spit 15 ml in mouth and rinse around teeth 3 times daily beginning 3 days prior to surgery  • Repeat morning of surgery before arriving at hospital    Nasal mixture Recipe:  • Neilmed bottle from pharmacy  • Use Yoav Med packet that comes with the bottle  • Add Patrick baby shampoo 2.5 ml (½ tsp) to the bottle  • Fill bottle with distilled water (from grocery store)       [email or web address]

## 2020-05-22 NOTE — PROGRESS NOTES
Alex Rock Jr, MD     ENT NEW PATIENT NOTE     Chief Complaint   Patient presents with   • Difficulty Swallowing     feels like a lump in throat   • Hoarse     changes in voice   • Shortness of Breath   • Nasal Congestion     deviated septum   • Sinus Problem     congestion        HISTORY OF PRESENT ILLNESS:  Accompanied by:   No one  Kevin Marr is a  29 y.o. male throat pain.  He had episode in Feb and presented to ER. He resolved.  Began again 4 days ago. He feels pain in his shamir's apple. He had enough pain where he could not sleep.  Voice is deeper. Since Feb.  Hemoptysis- none  Has some phlegm. Never loses voice completely.  Swallowing- hurts some but not usual sore throat throat.  He is swallowing pills ok.  Patient is using Benadryl at bedtime for anxiety and sleep from neck pain.  Smoke- none  Drink- once a month. He has had gastric bypass.  He was last seen a year ago.  No EGD.    Review of Systems  Reviewed per patient intake note and confirmed with patient    Past History:  Past Medical History:   Diagnosis Date   • Anxiety    • Attention deficit hyperactivity disorder    • Depression    • Diabetes mellitus (CMS/[facility])     patient reports that he is no longer diabetic    • Disease of thyroid gland     thyroid cyst    • Hypertension     no current problems    • Varicose veins of leg with pain      Past Surgical History:   Procedure Laterality Date   • CIRCUMCISION     • INGUINAL HERNIA REPAIR     • LYMPH NODE BIOPSY     • SAMIR-EN-Y PROCEDURE  07/24/2015    Dr Abreu [facility]    • THYROGLOSSAL CYSTECTOMY      removal of thryoglossal duct cyst 4 surgeries     Family History   Adopted: Yes   Family history unknown: Yes     Social History     Tobacco Use   • Smoking status: Former Smoker     Types: Cigarettes   • Tobacco comment: Quit 2015   Substance Use Topics   • Alcohol use: Yes     Comment: Ocassional   • Drug use: No     No outpatient medications have been marked as taking for  the 5/22/20 encounter (Office Visit) with Alex Rock Jr., MD.     Allergies:  Zithromax [azithromycin]        Vital Signs:   Temp:  [98.3 °F (36.8 °C)] 98.3 °F (36.8 °C)  Heart Rate:  [107] 107  BP: (147)/(88) 147/88  EXAMINATION:  CONSTITUTIONAL:    well nourished, well-developed, alert, oriented, in no acute distress     BODY HABITUS:    Normal body habitus    COMMUNICATION:    able to communicate normally, normal voice quality    HEAD:     Normocephalic, without obvious abnormality, atraumatic    FACE:    structure normal, no tenderness present, no lesions/masses, no evidence of trauma    SALIVARY GLANDS:    parotid glands with no tenderness, no swelling, no masses, submandibular glands with normal size, nontender     EYE:    ocular motility normal, eyelids normal, orbits normal, no proptosis, conjunctiva clear, sclera non-icteric, pupils equal, round, reactive to light and accomodation  Color:   blue    HEARING:    response to conversational voice normal    EARS:    Otoscopic exam   normal pinna with no lesions, Canals normal size and shape, Tympanic membranes normal, Ossicular chain intact, Middle ear clear     NOSE EXTERNAL:    APPEARANCE: normal, straight, with good projection, no tenderness, no lesions, no tenderness, good nasal support, patent nares    NOSE INTERNAL:    Anterior rhinoscopy   NASALMUCOSA:    intact mucosa, no lesions    NASAL PASSAGES:      Left   normal, patent     abnormal   Right- DNS   NASAL VALVE:     intact, good support and no nasal obstruction   SEPTUM:     normal mucosa without crusting or lesions, midline/no deviation, no perforation    deviation present:      deviated Right low to mid mod posterior cartilage   INFERIOR TURBINATES:     normal size, non-obstructing, no lesions    ORAL CAVITY:    Normal lips with no lesions, dentition normal for age, FOM intact without lesions and normal salivary flow, Mucosa intact without lesions, Hard and soft palate normal without  lesions    OROPHARYNX:    Direct examination  oropharyngeal mucosa normal, tonsil(s) with normal appearance      NECK:    normal appearance except scar over hyoid well -healed   LARYNGEAL POSITION: normal   LARYNGEAL ELEVATION:  Normal  TRACHEA:   midline   Thyroid cartilage with mild deviation ro right at thyroid notch with tenderness over the cartilage, no mass    LYMPH NODES :    no adenopathy    THYROID:    no overt thyromegaly, no tenderness, nodules or mass present on palpation, position midline    CHEST/RESPIRATORY:    respiratory effort normal, no rales, rubs or wheezing, no stridor, normal appearance to chest    CARDIOVASCULAR:    regular rate and rhythm, no murmurs, gallups, no peripheral edema    NEURO/PSYCHIATRIC :    oriented appropriately for age, mood normal, affect appropriate, cranial nerves intact grossly (unless specifically described), gait normal for age    RESULTS REVIEW:    I have reviewed the patients old records in the chart.           ASSESSMENT:     Diagnosis Plan   1. Dysphagia, oropharyngeal  CT Soft Tissue Neck With Contrast    from myofascial pain   2. Thyroid cartilage sprain, initial encounter  CT Soft Tissue Neck With Contrast    cartilaginous deviation from prior surgery   3. Thyroglossal cyst  CT Soft Tissue Neck With Contrast    by history  removal x 2   4. 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 Gastroesophageal reflux disease without esophagitis      mild  hx Bariatric surgery           PLAN:      Conservative management.  Patient appears to have myofascial tenderness of strap muscles. This may be related to prior neck surgery.  I feel no mass or infection.  I will treat SABA, myofascial pain and get CT to evaluate for any residual cyst.  I will plan scope to evaluate throat if no better next visit.  Benadryl at bedtime  Aleve BID  Heat to neck  CT scan to evaluate scarring of neck  Antacids   Reflux precautions  Covid test for flex scope  MY  CHART:  Patient is Patient is using My Chart     Orders Placed This Encounter   Procedures   • CT Soft Tissue Neck With Contrast          Patient understand(s) and agree(s) with the treatment plan as described.  Return After Ct neck, for Recheck neck, Flex scope, Flex scope.       Alex Rock Jr, MD  05/22/20  11:50

## 2020-05-22 NOTE — PROGRESS NOTES
Karla Coy LPN   Patient Intake Note    Review of Systems  Review of Systems   Constitutional: Negative for chills, fatigue and fever.   HENT:        See HPI   Respiratory: Positive for shortness of breath. Negative for cough and choking.    Gastrointestinal: Negative for diarrhea, nausea and vomiting.   Neurological: Negative for dizziness, light-headedness and headaches.   Psychiatric/Behavioral: Positive for sleep disturbance.       Tobacco Use: Screening and Cessation Intervention  Social History    Tobacco Use      Smoking status: Former Smoker        Types: Cigarettes      Tobacco comment: Quit 2015        Karla Coy LPN  5/22/2020  11:12

## 2020-05-27 ENCOUNTER — TRANSCRIBE ORDERS (OUTPATIENT)
Dept: ADMINISTRATIVE | Facility: HOSPITAL | Age: 30
End: 2020-05-27

## 2020-05-27 DIAGNOSIS — Z01.818 PRE-OP TESTING: Primary | ICD-10-CM

## 2020-06-09 ENCOUNTER — TELEPHONE (OUTPATIENT)
Dept: OTOLARYNGOLOGY | Facility: CLINIC | Age: 30
End: 2020-06-09

## 2020-06-09 NOTE — TELEPHONE ENCOUNTER
Called Patient to notify of cancelling appt on 6/12/20 and needing to r/s, Patient didn't get COVID testing today for scope on Friday.

## 2020-07-17 ENCOUNTER — HOSPITAL ENCOUNTER (EMERGENCY)
Facility: HOSPITAL | Age: 30
Discharge: HOME | End: 2020-07-17
Admitting: EMERGENCY MEDICINE

## 2020-07-17 VITALS
TEMPERATURE: 98 F | WEIGHT: 194 LBS | HEIGHT: 73 IN | DIASTOLIC BLOOD PRESSURE: 74 MMHG | HEART RATE: 74 BPM | SYSTOLIC BLOOD PRESSURE: 138 MMHG | OXYGEN SATURATION: 99 % | RESPIRATION RATE: 14 BRPM | BODY MASS INDEX: 25.71 KG/M2

## 2020-07-17 DIAGNOSIS — S61.412A LACERATION OF LEFT HAND WITHOUT FOREIGN BODY, INITIAL ENCOUNTER: Primary | ICD-10-CM

## 2020-07-17 PROCEDURE — 99283 EMERGENCY DEPT VISIT LOW MDM: CPT

## 2020-07-17 RX ORDER — KETOROLAC TROMETHAMINE 10 MG/1
10 TABLET, FILM COATED ORAL EVERY 6 HOURS PRN
Status: DISCONTINUED | OUTPATIENT
Start: 2020-07-17 | End: 2020-07-17 | Stop reason: HOSPADM

## 2020-07-17 RX ADMIN — KETOROLAC TROMETHAMINE 10 MG: 10 TABLET, FILM COATED ORAL at 13:31

## 2020-07-17 NOTE — ED PROVIDER NOTES
"Subjective   History of Present Illness    Patient is a 29-year-old male presenting to ED with left hand laceration.  Patient reported he is right-hand dominant and just prior to arrival was holding an X-Acto knife in his right hand cutting through a 2 inch foam at which time he pulled towards his body.  Patient reported because the X-Acto blade was lengthened it was \"wobbly\" at which time it slipped from the fall and lacerated his left hand in the webspace.  Patient reported he immediately put pressure on it was able to control the bleeding however he did not look or assessed the wound because he had become nauseous.  Patient denies any numbness or weakness of the left hand.  Patient reports his nausea has since subsided and he has had no further symptoms.  Patient denies any other injuries, decreased range of motion of the left hand, or bony tenderness.  Patient reported his tetanus status was updated 2 to 3 years ago when he stepped on a nail.  Patient denies any medication use prior to arrival.    Patient denies any recent illnesses or known sick contact.    Patient reports no medication allergies to Zithromax which causes him to have pedal edema.  Patient reports daily medication use of multivitamins, vitamin C, vitamin B12, and vitamin D.  Patient has a medical history positive for diabetes, hypertension, varicose veins, depression, ADHD, anxiety, as well as thyroid cyst.  Patient reports a surgical history positive for inguinal hernia repair, thyroglossal cystectomy, Drew-en-Y procedure, lymph node biopsy, and a circumcision.    Review of Systems   Constitutional: Negative.  Negative for chills, diaphoresis and fever.   HENT: Negative.    Eyes: Negative.    Respiratory: Negative.    Cardiovascular: Negative.    Gastrointestinal: Positive for nausea (immediatly after injury, since resolved).   Genitourinary: Negative.    Musculoskeletal: Negative.  Negative for arthralgias and joint swelling.   Skin: Positive " for wound (laceration, left hand web spacing).   Neurological: Negative.  Negative for weakness and numbness.   Psychiatric/Behavioral: Negative.    All other systems reviewed and are negative.      Past Medical History:   Diagnosis Date   • Anxiety    • Attention deficit hyperactivity disorder    • Depression    • Diabetes mellitus (CMS/HCC)     patient reports that he is no longer diabetic    • Disease of thyroid gland     thyroid cyst    • Hypertension     no current problems    • Varicose veins of leg with pain        Allergies   Allergen Reactions   • Zithromax [Azithromycin] Swelling       Past Surgical History:   Procedure Laterality Date   • CIRCUMCISION     • INGUINAL HERNIA REPAIR     • LYMPH NODE BIOPSY     • SAMIR-EN-Y PROCEDURE  07/24/2015    Dr Abreu [facility]    • THYROGLOSSAL CYSTECTOMY      removal of thryoglossal duct cyst 4 surgeries       Family History   Adopted: Yes   Family history unknown: Yes       Social History     Socioeconomic History   • Marital status: Single     Spouse name: Not on file   • Number of children: Not on file   • Years of education: Not on file   • Highest education level: Not on file   Tobacco Use   • Smoking status: Former Smoker     Types: Cigarettes   • Tobacco comment: Quit 2015   Substance and Sexual Activity   • Alcohol use: Yes     Comment: Ocassional   • Drug use: No           Objective   Physical Exam   Constitutional: He is oriented to person, place, and time. He appears well-developed and well-nourished. No distress.   HENT:   Head: Normocephalic and atraumatic.   Mouth/Throat: Oropharynx is clear and moist.   Eyes: Pupils are equal, round, and reactive to light. Conjunctivae and EOM are normal.   Neck: Normal range of motion. Neck supple.   Cardiovascular: Normal rate, regular rhythm, normal heart sounds and intact distal pulses.   Pulses:       Radial pulses are 2+ on the right side, and 2+ on the left side.   Pulmonary/Chest: Effort normal  and breath sounds normal.   Abdominal: Soft. Bowel sounds are normal.   Musculoskeletal:        Left hand: He exhibits laceration. He exhibits normal range of motion, no tenderness, normal capillary refill and no swelling. Normal sensation noted. Normal strength noted.        Hands:  3 cm long linear very superficial laceration to the palmar aspect of the left hand just medial to the thenar eminence.  Most proximal aspect is slightly open however remainder of laceration has nothing to pull together or close.  No surrounding edema.  No bony tenderness of the left hand.  No joint swelling.  Full range of motion of all joints of the left hand.  Bilateral upper extremities neurovascularly intact distally.   Neurological: He is alert and oriented to person, place, and time. No sensory deficit. He exhibits normal muscle tone.   Skin: Skin is warm and dry. Capillary refill takes less than 2 seconds. Laceration (as described in [facility]) noted. No abrasion and no bruising noted. He is not diaphoretic.   Psychiatric: His speech is normal and behavior is normal. Judgment normal. His mood appears anxious (mild).   Nursing note and vitals reviewed.      Laceration Repair  Date/Time: 7/17/2020 1:31 PM  Performed by: Joe Dooley PA-C  Authorized by: Joe Dooley PA-C     Consent:     Consent obtained:  Verbal    Consent given by:  Patient    Risks discussed:  Infection, need for additional repair, pain, poor cosmetic result, poor wound healing and vascular damage    Alternatives discussed:  No treatment, delayed treatment, observation and referral  Anesthesia (see MAR for exact dosages):     Anesthesia method:  None  Laceration details:     Location:  Hand    Hand location:  L palm    Length (cm):  3  Repair type:     Repair type:  Simple  Pre-procedure details:     Preparation:  Patient was prepped and draped in usual sterile fashion  Exploration:     Hemostasis achieved with:  Direct pressure    Wound exploration: wound  explored through full range of motion and entire depth of wound probed and visualized      Wound extent: no foreign bodies/material noted    Treatment:     Area cleansed with:  Betadine and saline    Amount of cleaning:  Extensive    Irrigation solution:  Sterile saline  Skin repair:     Repair method:  Tissue adhesive  Approximation:     Approximation:  Close  Post-procedure details:     Dressing:  Open (no dressing)    Patient tolerance of procedure:  Tolerated well, no immediate complications               ED Course  ED Course as of Jul 17 1354 Fri Jul 17, 2020   1331 Wound repair performed at bedside at this time with assistance from FLY Sorensen.    [JS]   1336 After wound reapir discussed with patient at length wound care.  Discussed need to keep wound clean and dry for the next 24 hours and then ability to allow warm soapy water to gently run over it.  Discussed need to avoid scrubbing too hard and need to avoid picking at the Dermabond, to allow it to fall off on its own time.  Discussed continued need for anti-inflammatory medication use for pain control.  Discussed return precautions, signs of infection, and need for immediate return to ED should he develop any new or worsening symptoms.  Discussed need for wound reevaluation through his primary care provider within the next 24 to 48 hours.  Patient with no further questions, concerns, or needs at this time and is now stable for discharge.    [JS]      ED Course User Index  [JS] Joe Dooley PA-C                                           MDM  Number of Diagnoses or Management Options  Laceration of left hand without foreign body, initial encounter:      Amount and/or Complexity of Data Reviewed  Tests in the medicine section of CPT®: reviewed and ordered  Decide to obtain previous medical records or to obtain history from someone other than the patient: yes  Review and summarize past medical records: yes    Patient Progress  Patient progress:  improved      Final diagnoses:   Laceration of left hand without foreign body, initial encounter            Joe Dooley PA-C  07/17/20 [id number]

## 2020-07-17 NOTE — DISCHARGE INSTRUCTIONS
As we discussed please keep your wound clean and dry for the next 24 hours and after that allow warm soapy water to gently run over it.  It is important that you do not pick or scratch at the Dermabond as you wanted to fall off in its own time.  Please watch the wound for surrounding redness, swelling, wound discharge, nausea, vomiting, or fevers as these may be signs of an infection.  Should you continue to have pain and discomfort please continue using Motrin and Tylenol over-the-counter.  Please read below for further information regarding care of lacerations which are not sutured, as yours was today.        Nonsutured Laceration Care  A laceration is a cut that may go through all layers of the skin and extend into the tissue that is right under the skin. This type of cut is usually stitched up (sutured) or closed with tape (adhesive strips) or skin glue shortly after the injury happens.  However, if the wound is dirty or if several hours pass before medical treatment is provided, it is likely that germs (bacteria) will enter the wound. Closing a laceration after bacteria have entered it increases the risk of infection. In these cases, your health care provider may leave the laceration open (nonsutured) and cover it with a bandage. This type of treatment helps prevent infection and allows the wound to heal from the deepest layer of tissue damage up to the surface.  An open fracture is a type of injury that may involve nonsutured lacerations. An open fracture is a break in a bone that happens along with lacerations through the skin at the fracture site.  What are the risks?  Caring for a nonsutured laceration is safe. However, problems may occur, including a higher risk for:  · Scarring.  · Infection.  · Slow healing.  Supplies needed:  · Soap.  · Hand .  · Sterile water or irrigation solution.  · Bandages (dressings).  · Clean towel.  · Antibiotic ointment.  How to care for your nonsutured  laceration  Follow instructions from your health care provider about how to take care of your wound.  · Keep the wound clean and dry.  · Change any dressings as told by your health care provider. This includes changing the dressing when it starts to smell, or when it gets wet or dirty.  · Clean the wound one time each day, or as often as told by your health care provider. To clean your wound:  ? Wash your hands with soap and water. If soap and water are not available, use hand .  ? Remove any dressing as told by your health care provider.  ? Clean the wound with sterile water or irrigation solution as told by your health care provider.  ? Pat the wound dry with a clean towel. Do not rub the wound.  ? Apply a thin layer of antibiotic ointment to the wound as told by your health care provider. This will prevent infection and keep the dressing from sticking to the wound.  ? Apply a new dressing as told by your health care provider.  · Check your wound every day for signs of infection. Watch for:  ? Redness, swelling, or pain.  ? Fluid, blood, or pus.  ? Bad smell on the wound or dressing.  ? Warmth.  · Do not take baths, swim, or do anything that puts your wound underwater until your health care provider approves.  · Do not scratch or pick at the wound.  Follow these instructions at home:  · Take or apply over-the-counter and prescription medicines only as told by your health care provider.  · If you were prescribed an antibiotic medicine, take or apply it as told by your health care provider. Do not stop using the antibiotic even if your condition improves.  · Do not inject anything into the wound unless directed by your health care provider.  · Raise (elevate) the injured area above the level of your heart while you are sitting or lying down, if possible.  · If directed, put ice on the affected area:  ? Put ice in a plastic bag.  ? Place a towel between your skin and the bag.  ? Leave the ice on for 20  minutes, 2-3 times a day.  · Keep all follow-up visits as told by your health care provider. This is important.  Contact a health care provider if:  · You received a tetanus shot and you have swelling, severe pain, redness, or bleeding at the injection site.  · You have a fever.  · Your pain is not controlled with medicine.  · You have increased redness, swelling, or pain at the site of your wound.  · You have fluid, blood, or pus coming from your wound.  · You notice a bad smell coming from your wound or your dressing.  · You notice something coming out of the wound, such as wood or glass.  · You notice a change in the color of your skin near your wound.  · You develop a new rash.  · You need to change the dressing frequently due to fluid, blood, or pus draining from the wound.  · You develop numbness around your wound.  Get help right away if:  · Your pain suddenly increases and is severe.  · You develop severe swelling around the wound.  · The wound is on your hand or foot and you cannot properly move a finger or toe.  · The wound is on your hand or foot, and you notice that your fingers or toes look pale or bluish.  · You have a red streak going away from your wound.  Summary  · A laceration is a cut that may go through all layers of the skin and extend into the tissue that is right under the skin. It is usually closed with stitches, tape, or skin glue shortly after the injury happens.  · If a wound is dirty or if several hours pass before medical treatment is provided, the laceration may be kept open (nonsutured) and covered with a bandage.  · This type of treatment helps prevent infection and allows the wound to heal from the deepest layer of tissue damage up to the surface.  · Follow instructions from your health care provider about how to take care of your wound.  This information is not intended to replace advice given to you by your health care provider. Make sure you discuss any questions you have with  your health care provider.  Document Released: 11/15/2007 Document Revised: 04/10/2020 Document Reviewed: 01/07/2019  [employer]vier Patient Education © 2020 [employer]

## 2025-03-13 ENCOUNTER — APPOINTMENT (OUTPATIENT)
Dept: GENERAL RADIOLOGY | Facility: HOSPITAL | Age: 35
End: 2025-03-13
Payer: MEDICAID

## 2025-03-13 PROCEDURE — 73080 X-RAY EXAM OF ELBOW: CPT

## 2025-03-13 PROCEDURE — 73110 X-RAY EXAM OF WRIST: CPT

## 2025-03-17 ENCOUNTER — OFFICE VISIT (OUTPATIENT)
Age: 35
End: 2025-03-17
Payer: MEDICAID

## 2025-03-17 ENCOUNTER — HOSPITAL ENCOUNTER (OUTPATIENT)
Dept: GENERAL RADIOLOGY | Facility: HOSPITAL | Age: 35
Discharge: HOME | End: 2025-03-17
Admitting: STUDENT IN AN ORGANIZED HEALTH CARE EDUCATION/TRAINING PROGRAM
Payer: MEDICAID

## 2025-03-17 VITALS — BODY MASS INDEX: 37.37 KG/M2 | WEIGHT: 282 LBS | HEIGHT: 73 IN

## 2025-03-17 DIAGNOSIS — S52.121A CLOSED DISPLACED FRACTURE OF HEAD OF RIGHT RADIUS, INITIAL ENCOUNTER: Primary | ICD-10-CM

## 2025-03-17 DIAGNOSIS — S52.121A CLOSED DISPLACED FRACTURE OF HEAD OF RIGHT RADIUS, INITIAL ENCOUNTER: ICD-10-CM

## 2025-03-17 PROCEDURE — 73080 X-RAY EXAM OF ELBOW: CPT

## 2025-03-17 PROCEDURE — 99204 OFFICE O/P NEW MOD 45 MIN: CPT | Performed by: STUDENT IN AN ORGANIZED HEALTH CARE EDUCATION/TRAINING PROGRAM

## 2025-03-17 PROCEDURE — 1159F MED LIST DOCD IN RCRD: CPT | Performed by: STUDENT IN AN ORGANIZED HEALTH CARE EDUCATION/TRAINING PROGRAM

## 2025-03-17 PROCEDURE — 1160F RVW MEDS BY RX/DR IN RCRD: CPT | Performed by: STUDENT IN AN ORGANIZED HEALTH CARE EDUCATION/TRAINING PROGRAM

## 2025-03-17 NOTE — PROGRESS NOTES
[facility] Orthopedics & Sports Medicine  Mani Boyer MD, PhD  Pranay Boyer PA-C    CHIEF COMPLAINT  Initial Evaluation of the Right Elbow (Patient presents today for closed displaced fracture of neck of right radius. X-ray performed on 3/13/25. Patient states he fell on 3/12/2025 in parking lot. Went to Urgent Care on 3/13/25. Patient states pain has decreased, but not much mobility.)       HISTORY OF PRESENT ILLNESS    History of Present Illness  The patient is a 34-year-old male who presents for evaluation of his right elbow.    He sustained an injury to his right elbow after tripping over rebar in a parking lot, resulting in a fall that caused significant abrasions to his knees and both palms. He reports no signs of infection at the injury site. Currently, he is on light duty at work, performing computer tasks with one hand. He experiences stiffness in his hand, which he attributes to the use of a splint. He also reports mild bicep pain, which he believes is due to the positioning of his hand. He has not observed any bruising but notes persistent swelling. His pain is exacerbated by certain activities such as lifting objects or driving. He reports increased mobility since Thursday. He describes the pain as more of an discomfort rather than severe pain. He reports no current wrist pain. He has been using a sling primarily during work hours and expresses concern about potential blood clots.    SOCIAL HISTORY  He works at [employer].       HISTORY    Current Outpatient Medications   Medication Instructions    Ascorbic Acid (VITAMIN C PO) Daily    B Complex Vitamins (vitamin b complex) capsule capsule Daily    calcium citrate-vitamin d (CITRACAL) 200-250 MG-UNIT tablet tablet 1 tablet, Daily    Cyanocobalamin (VITAMIN B 12 PO) Daily    ibuprofen (ADVIL,MOTRIN) 800 mg, Oral, Every 8 Hours PRN    Multiple Vitamin (MULTI VITAMIN PO) Daily         reports that he has quit smoking. His smoking use included  cigarettes. He does not have any smokeless tobacco history on file. He reports current alcohol use. He reports that he does not use drugs.    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 The patient hears me clearly and answers questions appropriately.   Musculoskeletal:  Right elbow:  Tenderness LCL, distal triceps tendon, medial bicep  Lacks ~15 deg extension. Full flexion.  Lacks ~10 deg supination. Full pronation.  Bruising medial biceps/elbow  Negative hook test  Nontender wrist, DRUJ      IMAGING    XR Elbow 3+ View Right  Result Date: 3/17/2025  Narrative: XR ELBOW 3+ VW RIGHT- 3/17/2025 8:14 AM  HISTORY: radial head fracture; S52.121A-Displaced fracture of head of right radius, initial encounter for closed fracture  COMPARISON: 3/13/2025  FINDINGS: Frontal and lateral radiographs of the elbow were provided for review.  There is a comminuted mildly displaced fracture of the radial head with no change in alignment. There is a persistent elbow joint effusion. There is no dislocation. Mineralization is normal.      Impression:  1. Comminuted mildly displaced fracture of the radial head no significant change given differences in technique and stable elbow joint effusion.  This report was signed and finalized on 3/17/2025 9:29 AM by Yves Ford.      XR Elbow 3+ View  Right  Result Date: 3/13/2025  Narrative: EXAMINATION: XR ELBOW 3+ VW RIGHT- 3/13/2025 8:10 AM  HISTORY: right elbow pain; M25.521-Pain in right elbow.  REPORT: 3 views of the right elbow were obtained.  COMPARISON: There are no correlative imaging studies for comparison.  There is a cortical step-off at the anterior margin of the radial neck compatible with a slightly impacted closed acute transverse fracture, which appears incomplete. Small joint effusion. The joint spaces are preserved. There is diffuse soft tissue swelling.      Impression: Minimally displaced incomplete closed acute fracture involving the neck of the radius with a small joint effusion.  This report was signed and finalized on 3/13/2025 8:12 AM by Dr. Ramirez Rosa MD.      XR Wrist 3+ View Right  Result Date: 3/13/2025  Narrative: EXAMINATION: XR WRIST 3+ VW RIGHT- 3/13/2025 8:09 AM  HISTORY: right wrist pain; M25.531-Pain in right wrist.  REPORT: 3 views of the right wrist were obtained.  COMPARISON: There are no correlative imaging studies for comparison.  Osseous alignment is normal, no fracture is identified. The joint spaces are preserved. The soft tissues appear within normal limits.      Impression: No acute osseous abnormality.  This report was signed and finalized on 3/13/2025 8:10 AM by Dr. Ramirez Rosa MD.         Results  X-rays above personally reviewed and interpreted.   X-ray shows a fracture at the radial head of the right elbow.       ASSESSMENT & PLAN  Diagnoses and all orders for this visit:    1. Closed displaced fracture of head of right radius, initial encounter (Primary)  -     XR Elbow 3+ View Right; Future    Patient has a fracture at the radial neck with minimal displacement.  Original injury was done last week had suboptimal positioning on the AP and repeat x-rays were ordered today.  There is redemonstration of the fracture that is comminuted and mildly displaced.  I encouraged him to continue using the sling  for another few days and then start working on some gentle range of motion.  I will plan to see him back in another 1 week for repeat examination.  If he is not able to progress with his range of motion we may need to consider a CT scan, but based on my examination today there was no clear mechanical block, just some stiffness from the swelling in his elbow and this is already starting to improve compared to last week.  He will need to remain on light duties for a number of weeks and I supplied him with a work note supporting this recommendation.  I did discuss with him that this type of fracture generally does not require surgical intervention, but if he does have a more clear mechanical block or if at subsequent appointments the fracture appears to be significantly displacing we will need to revisit the idea of possible surgical intervention.      Continue sling 3-5 days, then can d/c  Work on gentle ROM  Repeat XR today to ensure stability  Consider CT pending today's XR results  Follow up: 1 week        Patient or patient representative verbalized consent for the use of Ambient Listening during the visit with  Mani Boyer MD for chart documentation. 3/17/2025  09:05 CDT    Mani Boyer MD, PhD

## 2025-03-17 NOTE — LETTER
March 17, 2025     Patient: Kevin Marr   YOB: 1990   Date of Visit: 3/17/2025       To Whom It May Concern:    It is my medical opinion that Kevin Marr may return to light duty immediately with the following restrictions: no lifting with right arm . Should not return to full duties until cleared by physician. Anticipate 4-6 weeks of light duty.           Sincerely,        Mani Boyer MD    CC: No Recipients

## 2025-03-24 ENCOUNTER — TELEPHONE (OUTPATIENT)
Age: 35
End: 2025-03-24
Payer: MEDICAID

## 2025-03-24 DIAGNOSIS — S52.121A CLOSED DISPLACED FRACTURE OF HEAD OF RIGHT RADIUS, INITIAL ENCOUNTER: Primary | ICD-10-CM

## 2025-03-24 NOTE — TELEPHONE ENCOUNTER
"Relay     \"Patient is needing imaging prior to appt. Please arrive 30 minutes early to [facility] 2 outpatient imaging and lab.\"           "

## 2025-03-26 ENCOUNTER — HOSPITAL ENCOUNTER (OUTPATIENT)
Dept: GENERAL RADIOLOGY | Facility: HOSPITAL | Age: 35
Discharge: HOME | End: 2025-03-26
Admitting: STUDENT IN AN ORGANIZED HEALTH CARE EDUCATION/TRAINING PROGRAM
Payer: MEDICAID

## 2025-03-26 ENCOUNTER — OFFICE VISIT (OUTPATIENT)
Age: 35
End: 2025-03-26
Payer: MEDICAID

## 2025-03-26 VITALS — RESPIRATION RATE: 18 BRPM | HEIGHT: 73 IN | BODY MASS INDEX: 37.37 KG/M2 | WEIGHT: 282 LBS

## 2025-03-26 DIAGNOSIS — S52.121A CLOSED DISPLACED FRACTURE OF HEAD OF RIGHT RADIUS, INITIAL ENCOUNTER: Primary | ICD-10-CM

## 2025-03-26 DIAGNOSIS — S52.121A CLOSED DISPLACED FRACTURE OF HEAD OF RIGHT RADIUS, INITIAL ENCOUNTER: ICD-10-CM

## 2025-03-26 PROCEDURE — 73080 X-RAY EXAM OF ELBOW: CPT

## 2025-03-26 NOTE — PROGRESS NOTES
[facility] Orthopedics & Sports Medicine  Mani Boyer MD, PhD  Pranay Boyer PA-C    CHIEF COMPLAINT  Follow-up of the Right Elbow (Patient presents to the office today for follow up for closed displaced fracture of head of right radius. Xrays performed at [facility] on 03/26/2025. Patient states he cannot straighten arm as well. Patient still has dull pain. )       HISTORY OF PRESENT ILLNESS    History of Present Illness  The patient is a 34-year-old male who presents for follow-up on his right radius fracture.    He reports an improvement in his condition, although he experiences difficulty in fully extending his elbow. He describes the pain as akin to a bruise, which becomes noticeable upon exertion. He has been managing his work at [employer], which involves heavy lifting, by using his left hand. 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 He underwent wrist x-rays during his initial visit.    SOCIAL HISTORY  He works at [employer].       HISTORY    Current Outpatient Medications   Medication Instructions    Ascorbic Acid (VITAMIN C PO) Daily    B Complex Vitamins (vitamin b complex) capsule capsule Daily    calcium citrate-vitamin d (CITRACAL) 200-250 MG-UNIT tablet tablet 1 tablet, Daily    Cyanocobalamin (VITAMIN B 12 PO) Daily    ibuprofen (ADVIL,MOTRIN) 800 mg, Oral, Every 8 Hours PRN    Multiple Vitamin (MULTI VITAMIN PO) Daily         reports that he has quit smoking. His smoking use included cigarettes. He has never used smokeless tobacco. He reports current alcohol use. He reports that he does not use drugs.    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 The patient hears me clearly and answers questions appropriately.   Musculoskeletal:  Right elbow:  Lack ~5-10 deg of full extension  Pain and limitation with supination, full pronation  Mildly tender at radial head  Physical Exam        IMAGING    XR Elbow 3+ View Right  Result Date: 3/26/2025  Narrative: EXAMINATION:  XR ELBOW 3+ VW RIGHT-  3/26/2025 9:20 AM  HISTORY: S52.121A-Displaced fracture of head of right radius, initial encounter for closed fracture. Follow-up.  COMPARISON: 3/17/2025.  TECHNIQUE: 3 views were obtained.  FINDINGS: A comminuted fracture of the radial head is again noted. There is no significant displacement. No significant fat pad displacement is seen. There is edema posteriorly on the lateral image.       Impression: Mildly comminuted fracture of the radial head without significant displacement.    This report was signed and finalized on 3/26/2025 11:20 AM by Dr. Eddie Rojas MD.      XR Elbow 3+ View Right  Result Date: 3/17/2025  Narrative: XR ELBOW 3+ VW RIGHT- 3/17/2025 8:14 AM  HISTORY: radial head fracture; S52.121A-Displaced fracture of head of right radius, initial encounter for closed fracture  COMPARISON: 3/13/2025  FINDINGS: Frontal and lateral radiographs of the elbow were provided for review.  There is a comminuted mildly displaced fracture of the radial head with no change in alignment. There is a persistent elbow joint effusion. There is no dislocation. Mineralization is normal.      Impression:  1. Comminuted mildly displaced fracture of the radial head no significant change given differences in technique and stable elbow joint effusion.  This  report was signed and finalized on 3/17/2025 9:29 AM by Yves Ford.      XR Elbow 3+ View Right  Result Date: 3/13/2025  Narrative: EXAMINATION: XR ELBOW 3+ VW RIGHT- 3/13/2025 8:10 AM  HISTORY: right elbow pain; M25.521-Pain in right elbow.  REPORT: 3 views of the right elbow were obtained.  COMPARISON: There are no correlative imaging studies for comparison.  There is a cortical step-off at the anterior margin of the radial neck compatible with a slightly impacted closed acute transverse fracture, which appears incomplete. Small joint effusion. The joint spaces are preserved. There is diffuse soft tissue swelling.      Impression: Minimally displaced incomplete closed acute fracture involving the neck of the radius with a small joint effusion.  This report was signed and finalized on 3/13/2025 8:12 AM by Dr. Ramirez Rosa MD.      XR Wrist 3+ View Right  Result Date: 3/13/2025  Narrative: EXAMINATION: XR WRIST 3+ VW RIGHT- 3/13/2025 8:09 AM  HISTORY: right wrist pain; M25.531-Pain in right wrist.  REPORT: 3 views of the right wrist were obtained.  COMPARISON: There are no correlative imaging studies for comparison.  Osseous alignment is normal, no fracture is identified. The joint spaces are preserved. The soft tissues appear within normal limits.      Impression: No acute osseous abnormality.  This report was signed and finalized on 3/13/2025 8:10 AM by Dr. Ramirez Rosa MD.         Results  Imaging  New X-rays from today personally reviewed and interpreted. No interval displacement. Radius fracture again demonstrated without any obvious changes.          ASSESSMENT & PLAN  Diagnoses and all orders for this visit:    1. Closed displaced fracture of head of right radius, initial encounter (Primary)  -     XR Elbow 3+ View Right; Future         Assessment & Plan  1. Right radius fracture.  The patient's condition has shown significant improvement since the last visit. The fracture is stable, and the bone  is in the process of healing. He is advised to discontinue the use of the sling and gradually work on extending his range of motion. He should avoid strenuous activities that could potentially exacerbate the injury. Occupational therapy may be considered to help with stiffness and improve function. If the pain persists or worsens, x-rays of the hand will be considered.    Follow-up  The patient will follow up in 1 month.    Discontinue sling  Work on gentle ROM  Follow up: 4 weeks        Patient or patient representative verbalized consent for the use of Ambient Listening during the visit with  Mani Boyer MD for chart documentation. 3/27/2025  11:17 CDT    Mani Boyer MD, PhD

## 2025-04-23 ENCOUNTER — HOSPITAL ENCOUNTER (OUTPATIENT)
Dept: GENERAL RADIOLOGY | Facility: HOSPITAL | Age: 35
Discharge: HOME | End: 2025-04-23
Admitting: STUDENT IN AN ORGANIZED HEALTH CARE EDUCATION/TRAINING PROGRAM
Payer: MEDICAID

## 2025-04-23 ENCOUNTER — OFFICE VISIT (OUTPATIENT)
Age: 35
End: 2025-04-23
Payer: MEDICAID

## 2025-04-23 VITALS — HEIGHT: 73 IN | WEIGHT: 282 LBS | BODY MASS INDEX: 37.37 KG/M2

## 2025-04-23 DIAGNOSIS — S52.121A CLOSED DISPLACED FRACTURE OF HEAD OF RIGHT RADIUS, INITIAL ENCOUNTER: ICD-10-CM

## 2025-04-23 DIAGNOSIS — S52.121D CLOSED DISPLACED FRACTURE OF HEAD OF RIGHT RADIUS WITH ROUTINE HEALING, SUBSEQUENT ENCOUNTER: Primary | ICD-10-CM

## 2025-04-23 PROCEDURE — 73080 X-RAY EXAM OF ELBOW: CPT

## 2025-04-23 NOTE — PROGRESS NOTES
[facility] Orthopedics & Sports Medicine  Mani Boyer MD, PhD  Pranay Boyer PA-C    CHIEF COMPLAINT  Follow-up of the Right Elbow (Patient presents to the office today for follow up for closed displace fracture of head of right radius. X-rays performed at [facility] on 04/23/2025. Patient states elbow is feeling a little better.)       HISTORY OF PRESENT ILLNESS    History of Present Illness  The patient is a 34-year-old male who presents for follow-up on his elbow fracture.    Improvement in his condition is reported, although full extension of the elbow has not yet been achieved. Occasional discomfort is experienced, particularly when performing certain activities such as lifting his backpack over his shoulder. Despite this, he has regained the ability to perform daily tasks such as ear cleaning and applying deodorant. He also reports being able to use a screwdriver. A sensation of something catching in the elbow is described, which is believed to be preventing full extension. This limitation is not attributed to pain but rather to a perceived misalignment within the elbow joint.       HISTORY    Current Outpatient Medications   Medication Instructions    Ascorbic Acid (VITAMIN C PO) Daily    B Complex Vitamins (vitamin b complex) capsule capsule Daily    calcium citrate-vitamin d (CITRACAL) 200-250 MG-UNIT tablet tablet 1 tablet, Daily    Cyanocobalamin (VITAMIN B 12 PO) Daily    ibuprofen (ADVIL,MOTRIN) 800 mg, Oral, Every 8 Hours PRN    Multiple Vitamin (MULTI VITAMIN PO) Daily         reports that he has quit smoking. His smoking use included cigarettes. He has never used smokeless tobacco. He reports current alcohol use. He reports that he does not use drugs.    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 The patient hears me clearly and answers questions appropriately.   Musculoskeletal:  Physical Exam  Musculoskeletal:  Right Elbow:  - Limited extension by about 5 degrees.  Full flexion.  Normal pronation and supination.  - No swelling or effusion      IMAGING    XR Elbow 3+ View Right  Result Date: 4/23/2025  Narrative: EXAMINATION: XR ELBOW 3+ VW RIGHT- 4/23/2025 10:34 AM  HISTORY: f/u radius fracture; S52.121A-Displaced fracture of head of right radius, initial encounter for closed fracture.  REPORT: 3 views of the right elbow were obtained.  COMPARISON: X-rays of the right elbow 3/17/2025.  The intra-articular fracture involving the head and neck of the right radius is more apparent, appears to have slightly greater displacement on the oblique view. There is mild sclerosis adjacent to the fracture line of the radial neck on the lateral side. This probably represents developing callus. The joint spaces are preserved. No joint effusion is identified.      Impression: The intra-articular fracture involving the radial head is more apparent particularly on the oblique view, appears to have slightly greater displacement with minimal changes of healing, there is a small volume of callus formation adjacent to the radial neck on the lateral side.  This report was signed and finalized on 4/23/2025 10:37 AM by Dr. Ramirez Rosa MD.      XR Elbow 3+ View Right  Result Date: 3/26/2025  Narrative: EXAMINATION:  XR ELBOW 3+ VW RIGHT-  3/26/2025 9:20 AM  HISTORY:  S52.121A-Displaced fracture of head of right radius, initial encounter for closed fracture. Follow-up.  COMPARISON: 3/17/2025.  TECHNIQUE: 3 views were obtained.  FINDINGS: A comminuted fracture of the radial head is again noted. There is no significant displacement. No significant fat pad displacement is seen. There is edema posteriorly on the lateral image.       Impression: Mildly comminuted fracture of the radial head without significant displacement.    This report was signed and finalized on 3/26/2025 11:20 AM by Dr. Eddie Rojas MD.          Results  Imaging  X-rays above personally reviewed and interpreted.   Redemonstration of the fracture of the radial head and on oblique view there appears to be about 2 mm of displacement.  There are healing changes with a small amount of callus formation on lateral and AP views, especially compared to previous examinations.       ASSESSMENT & PLAN  Diagnoses and all orders for this visit:    1. Closed displaced fracture of head of right radius with routine healing, subsequent encounter (Primary)       We are about 6 weeks out from the original injury and on x-rays today there are signs of bony callus formation.  He still lacks a few degrees of full extension, which we have previously discussed is not uncommon with this injury.  There is slight displacement of the radial head fracture but his pain has improved and he overall has regained function of the elbow other than that slight lack of extension.  I am hopeful that over time that will improve some with continued healing and remodeling, but he understands that it may be persistent.  We also discussed that an intra-articular fracture like this can result in arthritis of the joint in the future.  However he is not interested in surgical intervention at this time given his clinical improvement.  If he continues to have issues or new problems arise we may consider advanced imaging in the future, but otherwise he can  advance his activities as tolerated and follow-up as needed.    Advance activities as tolerated  Follow up: As needed        Patient or patient representative verbalized consent for the use of Ambient Listening during the visit with  Mani Boyer MD for chart documentation. 4/23/2025  10:57 CDT      This document has been signed by Mani Boyer MD on April 23, 2025 10:38 CDT